# Patient Record
Sex: FEMALE | Race: WHITE | NOT HISPANIC OR LATINO | URBAN - METROPOLITAN AREA
[De-identification: names, ages, dates, MRNs, and addresses within clinical notes are randomized per-mention and may not be internally consistent; named-entity substitution may affect disease eponyms.]

---

## 2017-02-20 ENCOUNTER — OFFICE VISIT (OUTPATIENT)
Dept: ENDOCRINOLOGY | Facility: CLINIC | Age: 82
End: 2017-02-20

## 2017-02-20 VITALS
HEIGHT: 58 IN | SYSTOLIC BLOOD PRESSURE: 131 MMHG | WEIGHT: 140.5 LBS | DIASTOLIC BLOOD PRESSURE: 85 MMHG | BODY MASS INDEX: 29.49 KG/M2 | HEART RATE: 89 BPM

## 2017-02-20 DIAGNOSIS — E11.21 TYPE 2 DIABETES MELLITUS WITH DIABETIC NEPHROPATHY, WITHOUT LONG-TERM CURRENT USE OF INSULIN (H): ICD-10-CM

## 2017-02-20 DIAGNOSIS — M81.0 OSTEOPOROSIS: Primary | ICD-10-CM

## 2017-02-20 DIAGNOSIS — N18.30 CHRONIC KIDNEY DISEASE, STAGE III (MODERATE) (H): ICD-10-CM

## 2017-02-20 DIAGNOSIS — R60.0 BILATERAL LEG EDEMA: ICD-10-CM

## 2017-02-20 DIAGNOSIS — N25.81 SECONDARY RENAL HYPERPARATHYROIDISM (H): ICD-10-CM

## 2017-02-20 LAB — HBA1C MFR BLD: 6.6 % (ref 4.3–6)

## 2017-02-20 ASSESSMENT — PAIN SCALES - GENERAL: PAINLEVEL: NO PAIN (0)
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
## 2017-02-20 NOTE — NURSING NOTE
Chief Complaint   Patient presents with     RECHECK     F/U TYPE II DM     Vero Dalal, CMA  Endocrinology [facility] Diabetes 3G    Capillary Fingerstick performed for an Hemoglobin A1C test
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
## 2017-02-20 NOTE — MR AVS SNAPSHOT
After Visit Summary   2/20/2017    Angella Ward    MRN: [id number]           Patient Information     Date Of Birth          1/7/1934        Visit Information        Provider Department      2/20/2017 12:30 PM Dalila Thayer MD [facility]        Today's Diagnoses     Osteoporosis    -  1    Type 2 diabetes mellitus with diabetic nephropathy, without long-term current use of insulin (H)        Secondary renal hyperparathyroidism (H)        Chronic kidney disease, stage III (moderate)        Bilateral leg edema           Follow-ups after your visit        Follow-up notes from your care team     Return in about 6 months (around 8/20/2017).      Your next 10 appointments already scheduled     Jun 07, 2017 11:00 AM CDT   (Arrive by 10:45 AM)   Return Visit with Karen Keller MD   [facility] ([facility])    [address] [place] [address] [phone]            Aug 21, 2017 11:00 AM CDT   (Arrive by 10:45 AM)   RETURN ENDOCRINE with Dalila Thayer MD   [facility] ([facility])    [address] [place] [address] [phone]              Future tests that were ordered for you today     Open Future Orders        Priority Expected Expires Ordered    Cortisol Routine 3/1/2017 2/20/2018 2/20/2017    Adrenal corticotropin Routine 3/1/2017 2/20/2018 2/20/2017    Bone specific alk phosphatase Routine 3/1/2017 2/20/2018 2/20/2017            Who to contact     Please call your clinic at [phone] to:    Ask questions about your health    Make or cancel appointments    Discuss your medicines    Learn about your test results    Speak to your doctor   If you have compliments or concerns about an experience at your clinic, or if you wish to file a complaint, please contact [facility] Patient Relations at [phone] or email us at  "[email or web address]         Additional Information About Your Visit        [email or web address] Information     Conecta 2 is an electronic gateway that provides easy, online access to your medical records. With Conecta 2, you can request a clinic appointment, read your test results, renew a prescription or communicate with your care team.     To sign up for Conecta 2 visit the website at [email or web address]   You will be asked to enter the access code listed below, as well as some personal information. Please follow the directions to create your username and password.     Your access code is: [email or web address]  Expires: 2017  6:30 AM     Your access code will  in 90 days. If you need help or a new code, please contact your [facility] or call [phone] for assistance.        Care EveryWhere ID     This is your Care EveryWhere ID. This could be used by other organizations to access your [facility] medical records  [id number]        Your Vitals Were     Pulse Height BMI (Body Mass Index)             89 1.473 m (4' 10\") 29.36 kg/m2          Blood Pressure from Last 3 Encounters:   17 131/85   08/15/16 150/90   16 142/87    Weight from Last 3 Encounters:   17 63.7 kg (140 lb 8 oz)   08/15/16 64.9 kg (143 lb)   16 64.4 kg (142 lb)                 Today's Medication Changes          These changes are accurate as of: 17  1:31 PM.  If you have any questions, ask your nurse or doctor.               Start taking these medicines.        Dose/Directions    COMPRESSION STOCKINGS   Used for:  Chronic kidney disease, stage III (moderate), Bilateral leg edema, Type 2 diabetes mellitus with diabetic nephropathy, without long-term current use of insulin (H)   Started by:  Dalila Thayer MD        Please dispense 2 pairs of customized compression stockings   Quantity:  2 each   Refills:  3            Where to get your medicines      These medications " were sent to [facility] #[id number] - [place], [place] - [address]  [address] [facility] [address]     Phone:  [phone]     COMPRESSION STOCKINGS                Primary Care Provider Office Phone # Fax #    [facility] [phone] [address] [facility] [address]        Thank you!     Thank you for choosing [facility]  for your care. Our goal is always to provide you with excellent care. Hearing back from our patients is one way we can continue to improve our services. Please take a few minutes to complete the written survey that you may receive in the mail after your visit with us. Thank you!             Your Updated Medication List - Protect others around you: Learn how to safely use, store and throw away your medicines at www.disposemymeds.org.          This list is accurate as of: 2/20/17  1:31 PM.  Always use your most recent med list.                   Brand Name Dispense Instructions for use    aspirin 81 MG chewable tablet     108 tablet    Take 1 tablet (81 mg) by mouth daily       atorvastatin 10 MG tablet    LIPITOR    90 tablet    Take 1 tablet (10 mg) by mouth daily       * blood glucose monitoring test strip    no brand specified    3 Month    1 Box by Device route 3 times daily. Please dispense three month supply with three refills of whatever test strip Angella needs.  Thank you.       * ACCU-CHEK ACTIVE STRIPS test strip   Generic drug:  blood glucose monitoring          blood pressure kit Kit     1 kit    1 kit 2 times daily       COMPRESSION STOCKINGS     2 each    Please dispense 2 pairs of customized compression stockings       furosemide 20 MG tablet    LASIX     Take 20 mg by mouth       GHT BLOOD GLUCOSE MONITOR W/DEVICE Kit          glimepiride 1 MG tablet    AMARYL    90 tablet    Take 1 tablet (1 mg) by mouth every morning (before breakfast)       losartan 100 MG tablet    COZAAR     Take 100 mg by mouth       metFORMIN  1000 MG tablet    GLUCOPHAGE    180 tablet    Take 1 tablet (1,000 mg) by mouth 2 times daily (with meals)       * Notice:  This list has 2 medication(s) that are the same as other medications prescribed for you. Read the directions carefully, and ask your doctor or other care provider to review them with you.

## 2017-03-06 ENCOUNTER — TELEPHONE (OUTPATIENT)
Dept: ENDOCRINOLOGY | Facility: CLINIC | Age: 82
End: 2017-03-06

## 2017-03-06 NOTE — TELEPHONE ENCOUNTER
----- Message from Alycia Weir RN sent at 3/6/2017  2:03 PM CST -----  Regarding: FW: lab orders faxed to [facility]  Contact: [phone]      ----- Message -----     From: Sharla Cavanaugh     Sent: 3/6/2017   1:37 PM       To: [facility] T[facility]-  Subject: lab orders faxed to [facility]                       Pt would like to have Dr Thayer lab orders faxed to [facility] [phone] per previous request.    Thanks    Sharla JACOBSON    Please DO NOT send this message and/or reply back to sender.  Call Center Representatives DO NOT respond to messages.

## 2017-03-06 NOTE — TELEPHONE ENCOUNTER
----- Message from Alycia Weir RN sent at 3/6/2017  2:02 PM CST -----  Regarding: FW: Correct Fax # - Dr Cano  Contact: [phone]      ----- Message -----     From: Oscar Vyas     Sent: 3/6/2017   1:43 PM       To: [facility] T[facility]-  Subject: Correct Fax # - Dr Cano                     The pt called with the correct fax # for the [facility] lab: [phone].    The pt can be reached at [phone] if needed    Thanks - oscar    Please DO NOT send this message and/or reply back to sender.  Call Center Representatives DO NOT respond to messages.

## 2017-04-24 ENCOUNTER — OFFICE VISIT (OUTPATIENT)
Dept: DERMATOLOGY | Facility: CLINIC | Age: 82
End: 2017-04-24

## 2017-04-24 DIAGNOSIS — I87.2 STASIS DERMATITIS OF BOTH LEGS: Primary | ICD-10-CM

## 2017-04-24 DIAGNOSIS — L82.1 SEBORRHEIC KERATOSIS: ICD-10-CM

## 2017-04-24 RX ORDER — TRIAMCINOLONE ACETONIDE 1 MG/G
OINTMENT TOPICAL 2 TIMES DAILY
Qty: 80 G | Refills: 1 | Status: SHIPPED | OUTPATIENT
Start: 2017-04-24 | End: 2017-07-06

## 2017-04-24 ASSESSMENT — PAIN SCALES - GENERAL: PAINLEVEL: MODERATE PAIN (5)

## 2017-04-24 NOTE — NURSING NOTE
Dermatology Rooming Note    Angella Ward's goals for this visit include:   Chief Complaint   Patient presents with     Derm Problem     Angella states she has a rash on her left leg starting on her right. It is painful, dry and itchy.      Derm Problem     Angella states the mole on her left chest is changing.     Risa Monzon CMA

## 2017-04-24 NOTE — PROGRESS NOTES
[facility] Dermatology Note      Dermatology Problem List:  1. History of NMSC  -BCC, left upper arm, s/p ED&C 10/27/2016  -SCCIS, right forearm s/p excision 10/2014  -Superficial BCC, mid inferior back, s/p ED&C.    -Superficial and nodular BCC, mid superior back, s/p excision 10/02/2014.     2. Actinic keratoses    Encounter Date: Apr 24, 2017    CC:  Chief Complaint   Patient presents with     Derm Problem     Angella states she has a rash on her left leg starting on her right. It is painful, dry and itchy.      Derm Problem     Angella states the mole on her left chest is changing.       History of Present Illness:  Ms. Angella Ward is a 83 year old female who presents as a follow-up after four months with a new concern. She states she has had a rash on her lower legs for the last 2-3 months. It started on the left and now is on her right. There are times when it is dry and itchy. She is not using anything on these areas. She also notices a growth on the left side of her chest. She thinks it is changing. It is not painful or bleeding.  She otherwise has no new/concerning/changing lesions.     Past Medical History:   Patient Active Problem List   Diagnosis     Hyperlipidemia LDL goal <100     Colitis     Rotator cuff syndrome     Advanced directives, counseling/discussion     Hypertension goal BP (blood pressure) < 140/90     Adhesive capsulitis of left shoulder     CKD (chronic kidney disease) stage 3, GFR 30-59 ml/min     Chronic pain in shoulder     Arthritis of foot, left     Basal cell carcinoma in situ of skin of shoulder     Seborrheic keratosis     Toe pain, right     Glenohumeral arthritis     Osteoporosis     Obesity     Balance problems     Skin lesion     Limitation of joint motion of shoulder     SOB (shortness of breath)     Chronic diarrhea     Hard of hearing, right     Type 2 diabetes mellitus with diabetic nephropathy (H)     Microalbuminuric diabetic nephropathy (H)      Edema of left lower extremity     CKD (chronic kidney disease) stage 4, GFR 15-29 ml/min (H)     Past Medical History:   Diagnosis Date     Basal cell carcinoma      Chronic pain in shoulder 9/28/2012     CKD (chronic kidney disease) stage 3, GFR 30-59 ml/min 6/1/2012     Glenohumeral arthritis 6/10/2013    Noted on xray [facility], has rec'd left shoulder injections 7/13/10, see scan     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 The patient denies use of tanning beds or sun exposure.     Family History:  Not pertinent to this visit.     Medications:  Current Outpatient Prescriptions   Medication Sig Dispense Refill     VITAMIN D, CHOLECALCIFEROL, PO Take 1,000 Units by mouth daily       COMPRESSION STOCKINGS Please dispense 2 pairs of customized compression stockings 2 each 3     furosemide (LASIX) 20 MG tablet Take 20 mg by mouth       losartan (COZAAR) 100 MG tablet Take 100 mg by mouth       blood glucose monitoring (ACCU-CHEK ACTIVE STRIPS) test strip        Blood Glucose Monitoring Suppl ([facility] BLOOD GLUCOSE MONITOR) W/DEVICE KIT        glimepiride (AMARYL) 1 MG tablet Take 1 tablet (1 mg) by mouth every morning (before breakfast) 90 tablet 3     metFORMIN (GLUCOPHAGE) 1000 MG tablet Take 1 tablet (1,000 mg) by mouth 2 times daily (with meals) 180 tablet 3     aspirin 81 MG chewable tablet Take 1 tablet (81 mg) by mouth daily 108 tablet 3     Blood Pressure Monitoring (BLOOD PRESSURE KIT) KIT 1 kit 2 times daily 1 kit 0     glucose blood VI test strips strip 1 Box by Device route 3 times daily. Please dispense three month supply with three refills of whatever test strip Angella needs.  Thank you. 3 Month 3     atorvastatin (LIPITOR) 10 MG tablet Take 1 tablet (10 mg)  by mouth daily (Patient not taking: Reported on 4/24/2017) 90 tablet 3     Allergies   Allergen Reactions     Nifedipine      PN: LW Reaction: ankle swelling     Nkda [No Known Drug Allergies]      No Clinical Screening - See Comments      PN: TODD CM1: >>> NO CONTRAST ADVERSE REACTION <<<     Reaction :         Review of Systems:  -Skin: As above in HPI. No additional skin concerns.  -Const: The patient is generally feeling well today.     Physical exam:  Vitals: There were no vitals taken for this visit.  GEN: This is a well developed, well-nourished female in no acute distress, in a pleasant mood.     SKIN: Sun-exposed skin, which includes the head/face, neck, both arms, digits, and/or nails was examined. Exam of chest, abdomen and back performed. Also examined bilateral legs from knees to ankles. She declines further exam of areas.  - Numerous waxy brown, papules of various sizes in the inframamillary folds, on entire back  and right popliteal fossa  - Left upper arm, there is a  pink scar at the site of prior ED&C  - On the upper right back, there are two healing hypopigmented atrophic scars. No signs of recurrence    -There is erythema noted to bilateral lower extremities, non tender, mild edema note. Strong pulses dorsalis pedis bilaterally.   - No other lesions of concern on areas examined.     Impression/Plan:  1. History of nonmelanoma skin cancer, no clincial evidence of recurrence. ED&C site from recent BCC is healing well.     Patient advised on the use of sun protection and monitoring for any new/changing lesions    2. Stasis dermatitis bilateral lower extremities,.  Start  use triamcinolone 0.1% ointment twice daily while redness is there. Stop if it resolves.    Steroid ed given.     Follow up in 1 month.     Recommend compression stockings during the day and getting her legs up.     Recommend she take her diuretics regularly.   3. Seborrheic keratosis on the left chest. Reassurance given.    All risk,  benefits and alternatives were discussed with patient.  Patient is in agreement and understands the assessment and plan.  All questions were answered.  Sun Screen Education was given.   Return to Clinic in 1 month or sooner as needed.   Risa Freeman PA-C

## 2017-04-24 NOTE — MR AVS SNAPSHOT
After Visit Summary   4/24/2017    Angella Ward    MRN: [id number]           Patient Information     Date Of Birth          1/7/1934        Visit Information        Provider Department      4/24/2017 2:00 PM Risa Freeman PA-C [facility]        Today's Diagnoses     Stasis dermatitis of both legs    -  1       Follow-ups after your visit        Your next 10 appointments already scheduled     May 24, 2017 11:00 AM CDT   (Arrive by 10:45 AM)   Return Visit with KELSEY Dang [facility]ology ([facility])    [address] [place] [address] [phone]            Jun 07, 2017 11:00 AM CDT   (Arrive by 10:45 AM)   Return Visit with Karen Keller MD   [facility]at[facility] ([facility])    [address] [place] [address] [phone]            Aug 21, 2017 11:00 AM CDT   (Arrive by 10:45 AM)   RETURN ENDOCRINE with Dalila Thayer MD   [facility] ([facility])    [address] [place] [address] [phone]              Who to contact     Please call your clinic at [phone] to:    Ask questions about your health    Make or cancel appointments    Discuss your medicines    Learn about your test results    Speak to your doctor   If you have compliments or concerns about an experience at your clinic, or if you wish to file a complaint, please contact [facility] Patient Relations at [phone] or email us at [email or web address]         Additional Information About Your Visit        MyChart Information     Space Adventures is an electronic gateway that provides easy, online access to your medical records. With Space Adventures, you can request a clinic appointment, read your test results, renew a prescription or communicate with your care team.     To sign up for  MyCevelinet visit the website at [email or web address]   You will be asked to enter the access code listed below, as well as some personal information. Please follow the directions to create your username and password.     Your access code is: [email or web address]  Expires: 2017  7:30 AM     Your access code will  in 90 days. If you need help or a new code, please contact your [facility] or call [phone] for assistance.        Care EveryWhere ID     This is your Care EveryWhere ID. This could be used by other organizations to access your [place] medical records  [id number]         Blood Pressure from Last 3 Encounters:   17 131/85   08/15/16 150/90   16 142/87    Weight from Last 3 Encounters:   17 63.7 kg (140 lb 8 oz)   08/15/16 64.9 kg (143 lb)   16 64.4 kg (142 lb)              Today, you had the following     No orders found for display         Today's Medication Changes          These changes are accurate as of: 17  2:18 PM.  If you have any questions, ask your nurse or doctor.               Start taking these medicines.        Dose/Directions    triamcinolone 0.1 % ointment   Commonly known as:  KENALOG   Used for:  Stasis dermatitis of both legs   Started by:  Risa Freeman PA-C        Apply topically 2 times daily   Quantity:  80 g   Refills:  1            Where to get your medicines      [id number]  [address]   [facility] [address]     Phone:  [phone]     triamcinolone 0.1 % ointment                Primary Care Provider Office Phone # Fax #    [facility] [phone]       [facility] [facility] [address]        Thank you!     Thank you for choosing [facility]MAT[facility]  for your care. Our goal is always to provide you with excellent care. Hearing back from our patients is one way we can continue to  improve our services. Please take a few minutes to complete the written survey that you may receive in the mail after your visit with us. Thank you!             Your Updated Medication List - Protect others around you: Learn how to safely use, store and throw away your medicines at www.disposemymeds.org.          This list is accurate as of: 4/24/17  2:18 PM.  Always use your most recent med list.                   Brand Name Dispense Instructions for use    aspirin 81 MG chewable tablet     108 tablet    Take 1 tablet (81 mg) by mouth daily       atorvastatin 10 MG tablet    LIPITOR    90 tablet    Take 1 tablet (10 mg) by mouth daily       * blood glucose monitoring test strip    no brand specified    3 Month    1 Box by Device route 3 times daily. Please dispense three month supply with three refills of whatever test strip Angella needs.  Thank you.       * ACCU-CHEK ACTIVE STRIPS test strip   Generic drug:  blood glucose monitoring          blood pressure kit Kit     1 kit    1 kit 2 times daily       COMPRESSION STOCKINGS     2 each    Please dispense 2 pairs of customized compression stockings       furosemide 20 MG tablet    LASIX     Take 20 mg by mouth       GHT BLOOD GLUCOSE MONITOR W/DEVICE Kit          glimepiride 1 MG tablet    AMARYL    90 tablet    Take 1 tablet (1 mg) by mouth every morning (before breakfast)       losartan 100 MG tablet    COZAAR     Take 100 mg by mouth       metFORMIN 1000 MG tablet    GLUCOPHAGE    180 tablet    Take 1 tablet (1,000 mg) by mouth 2 times daily (with meals)       triamcinolone 0.1 % ointment    KENALOG    80 g    Apply topically 2 times daily       VITAMIN D (CHOLECALCIFEROL) PO      Take 1,000 Units by mouth daily       * Notice:  This list has 2 medication(s) that are the same as other medications prescribed for you. Read the directions carefully, and ask your doctor or other care provider to review them with you.

## 2017-04-24 NOTE — LETTER
4/24/2017       RE: Angella Ward  [address]   [facility] [address]     Dear Colleague,    Thank you for referring your patient, Angella Ward, to the [facility] DERMATOLOGY at [facility]. Please see a copy of my visit note below.    [facility] Dermatology Note      Dermatology Problem List:  1. History of NMSC  -BCC, left upper arm, s/p ED&C 10/27/2016  -SCCIS, right forearm s/p excision 10/2014  -Superficial BCC, mid inferior back, s/p ED&C.    -Superficial and nodular BCC, mid superior back, s/p excision 10/02/2014.     2. Actinic keratoses    Encounter Date: Apr 24, 2017    CC:  Chief Complaint   Patient presents with     Derm Problem     Angella states she has a rash on her left leg starting on her right. It is painful, dry and itchy.      Derm Problem     Angella states the mole on her left chest is changing.       History of Present Illness:  Ms. Angella Ward is a 83 year old female who presents as a follow-up after four months with a new concern. She states she has had a rash on her lower legs for the last 2-3 months. It started on the left and now is on her right. There are times when it is dry and itchy. She is not using anything on these areas. She also notices a growth on the left side of her chest. She thinks it is changing. It is not painful or bleeding.  She otherwise has no new/concerning/changing lesions.     Past Medical History:   Patient Active Problem List   Diagnosis     Hyperlipidemia LDL goal <100     Colitis     Rotator cuff syndrome     Advanced directives, counseling/discussion     Hypertension goal BP (blood pressure) < 140/90     Adhesive capsulitis of left shoulder     CKD (chronic kidney disease) stage 3, GFR 30-59 ml/min     Chronic pain in shoulder     Arthritis of foot, left     Basal cell carcinoma in situ of skin of shoulder     Seborrheic keratosis     Toe pain, right     Glenohumeral arthritis      Osteoporosis     Obesity     Balance problems     Skin lesion     Limitation of joint motion of shoulder     SOB (shortness of breath)     Chronic diarrhea     Hard of hearing, right     Type 2 diabetes mellitus with diabetic nephropathy (H)     Microalbuminuric diabetic nephropathy (H)     Edema of left lower extremity     CKD (chronic kidney disease) stage 4, GFR 15-29 ml/min (H)     Past Medical History:   Diagnosis Date     Basal cell carcinoma      Chronic pain in shoulder 9/28/2012     CKD (chronic kidney disease) stage 3, GFR 30-59 ml/min 6/1/2012     Glenohumeral arthritis 6/10/2013    Noted on xray [facility], has rec'd left shoulder injections 7/13/10, see scan     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 The patient denies use of tanning beds or sun exposure.     Family History:  Not pertinent to this visit.     Medications:  Current Outpatient Prescriptions   Medication Sig Dispense Refill     VITAMIN D, CHOLECALCIFEROL, PO Take 1,000 Units by mouth daily       COMPRESSION STOCKINGS Please dispense 2 pairs of customized compression stockings 2 each 3     furosemide (LASIX) 20 MG tablet Take 20 mg by mouth       losartan (COZAAR) 100 MG tablet Take 100 mg by mouth       blood glucose monitoring (ACCU-CHEK ACTIVE STRIPS) test strip        Blood Glucose Monitoring Suppl ([facility] BLOOD GLUCOSE MONITOR) W/DEVICE KIT        glimepiride (AMARYL) 1 MG tablet Take 1 tablet (1 mg) by mouth every morning (before breakfast) 90 tablet 3     metFORMIN (GLUCOPHAGE) 1000 MG tablet Take 1 tablet (1,000 mg) by mouth 2 times daily (with meals) 180 tablet 3     aspirin 81 MG chewable tablet Take 1 tablet (81 mg) by mouth daily 108 tablet 3     Blood Pressure Monitoring (BLOOD  PRESSURE KIT) KIT 1 kit 2 times daily 1 kit 0     glucose blood VI test strips strip 1 Box by Device route 3 times daily. Please dispense three month supply with three refills of whatever test strip Angella needs.  Thank you. 3 Month 3     atorvastatin (LIPITOR) 10 MG tablet Take 1 tablet (10 mg) by mouth daily (Patient not taking: Reported on 4/24/2017) 90 tablet 3     Allergies   Allergen Reactions     Nifedipine      PN: LW Reaction: ankle swelling     Nkda [No Known Drug Allergies]      No Clinical Screening - See Comments      PN: LW CM1: >>> NO CONTRAST ADVERSE REACTION <<<     Reaction :     Review of Systems:  -Skin: As above in HPI. No additional skin concerns.  -Const: The patient is generally feeling well today.     Physical exam:  Vitals: There were no vitals taken for this visit.  GEN: This is a well developed, well-nourished female in no acute distress, in a pleasant mood.     SKIN: Sun-exposed skin, which includes the head/face, neck, both arms, digits, and/or nails was examined. Exam of chest, abdomen and back performed. Also examined bilateral legs from knees to ankles. She declines further exam of areas.  - Numerous waxy brown, papules of various sizes in the inframamillary folds, on entire back  and right popliteal fossa  - Left upper arm, there is a  pink scar at the site of prior ED&C  - On the upper right back, there are two healing hypopigmented atrophic scars. No signs of recurrence    -There is erythema noted to bilateral lower extremities, non tender, mild edema note. Strong pulses dorsalis pedis bilaterally.   - No other lesions of concern on areas examined.     Impression/Plan:  1. History of nonmelanoma skin cancer, no clincial evidence of recurrence. ED&C site from recent BCC is healing well.     Patient advised on the use of sun protection and monitoring for any new/changing lesions    2. Stasis dermatitis bilateral lower extremities,.  Start  use triamcinolone 0.1% ointment twice  daily while redness is there. Stop if it resolves.    Steroid ed given.     Follow up in 1 month.     Recommend compression stockings during the day and getting her legs up.     Recommend she take her diuretics regularly.   3. Seborrheic keratosis on the left chest. Reassurance given.    All risk, benefits and alternatives were discussed with patient.  Patient is in agreement and understands the assessment and plan.  All questions were answered.  Sun Screen Education was given.   Return to Clinic in 1 month or sooner as needed.   Risa Freeman PA-C

## 2017-05-24 ENCOUNTER — OFFICE VISIT (OUTPATIENT)
Dept: DERMATOLOGY | Facility: CLINIC | Age: 82
End: 2017-05-24

## 2017-05-24 DIAGNOSIS — I87.2 STASIS DERMATITIS OF BOTH LEGS: Primary | ICD-10-CM

## 2017-05-24 ASSESSMENT — PAIN SCALES - GENERAL: PAINLEVEL: NO PAIN (0)

## 2017-05-24 NOTE — NURSING NOTE
"Dermatology Rooming Note    Angella Ward's goals for this visit include:   Chief Complaint   Patient presents with     Derm Problem     Stasis dermatitis follow up, Angella states \" it is a little better.\"     Estrella Sorto LPN  "

## 2017-05-24 NOTE — LETTER
"5/24/2017       RE: Angella Ward  [address]   [facility] [address]     Dear Colleague,    Thank you for referring your patient, Angella Ward, to the [facility] DERMATOLOGY at [facility]. Please see a copy of my visit note below.          [facility] Dermatology Note      Dermatology Problem List:  1. History of NMSC  -BCC, left upper arm, s/p ED&C 10/27/2016  -SCCIS, right forearm s/p excision 10/2014  -Superficial BCC, mid inferior back, s/p ED&C.    -Superficial and nodular BCC, mid superior back, s/p excision 10/02/2014.     2. Actinic keratoses  3. Stasis dermatitis. Triamcinolone ointment.   Encounter Date: May 24, 2017    CC:  Chief Complaint   Patient presents with     Derm Problem     Stasis dermatitis follow up, Angella states \" it is a little better.\"       History of Present Illness:  Ms. Angella Ward is a 83 year old female who presents after one month for follow up of statis dermatitis. She was last seen 4/24/2017. She was prescribed triamcinolone 0.1% ointment twice daily. She believe the redness has improved but is still mildly there, especially if she does not take her diuretic for 1-2 days. She does not like to take these kinds of medications when she leaves her home for errands or appointments.     She otherwise has no new/concerning/changing lesions.         Past Medical History:   Patient Active Problem List   Diagnosis     Hyperlipidemia LDL goal <100     Colitis     Rotator cuff syndrome     Advanced directives, counseling/discussion     Hypertension goal BP (blood pressure) < 140/90     Adhesive capsulitis of left shoulder     CKD (chronic kidney disease) stage 3, GFR 30-59 ml/min     Chronic pain in shoulder     Arthritis of foot, left     Basal cell carcinoma in situ of skin of shoulder     Seborrheic keratosis     Toe pain, right     Glenohumeral arthritis     Osteoporosis     Obesity     Balance problems "     Skin lesion     Limitation of joint motion of shoulder     SOB (shortness of breath)     Chronic diarrhea     Hard of hearing, right     Type 2 diabetes mellitus with diabetic nephropathy (H)     Microalbuminuric diabetic nephropathy (H)     Edema of left lower extremity     CKD (chronic kidney disease) stage 4, GFR 15-29 ml/min (H)     Past Medical History:   Diagnosis Date     Basal cell carcinoma      Chronic pain in shoulder 9/28/2012     CKD (chronic kidney disease) stage 3, GFR 30-59 ml/min 6/1/2012     Glenohumeral arthritis 6/10/2013    Noted on xray [facility], has rec'd left shoulder injections 7/13/10, see scan     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 The patient denies use of tanning beds or sun exposure.     Family History:  Not pertinent to this visit.     Medications:  Current Outpatient Prescriptions   Medication Sig Dispense Refill     glimepiride (AMARYL) 1 MG tablet Take 1 tablet (1 mg) by mouth every morning (before breakfast) 90 tablet 3     Probiotic Product (PROBIOTIC & ACIDOPHILUS EX ST) CAPS Take 1 capsule by mouth daily       carvedilol (COREG) 6.25 MG tablet Take 1 tablet (6.25 mg) by mouth 2 times daily (with meals) 180 tablet 3     triamcinolone (KENALOG) 0.1 % ointment Apply to lower leg rash nightly (cover with socks) 80 g 3     VITAMIN D, CHOLECALCIFEROL, PO Take 1,000 Units by mouth daily       COMPRESSION STOCKINGS Please dispense 2 pairs of customized compression stockings 2 each 3     furosemide (LASIX) 20 MG tablet Take 80 mg by mouth        losartan (COZAAR) 100 MG tablet Take 100 mg by mouth       blood glucose monitoring (ACCU-CHEK ACTIVE STRIPS) test strip        Blood Glucose Monitoring Suppl (GHT BLOOD GLUCOSE MONITOR)  W/DEVICE KIT        atorvastatin (LIPITOR) 10 MG tablet Take 1 tablet (10 mg) by mouth daily (Patient not taking: Reported on 7/6/2017) 90 tablet 3     metFORMIN (GLUCOPHAGE) 1000 MG tablet Take 1 tablet (1,000 mg) by mouth 2 times daily (with meals) 180 tablet 3     aspirin 81 MG chewable tablet Take 1 tablet (81 mg) by mouth daily 108 tablet 3     Blood Pressure Monitoring (BLOOD PRESSURE KIT) KIT 1 kit 2 times daily 1 kit 0     glucose blood VI test strips strip 1 Box by Device route 3 times daily. Please dispense three month supply with three refills of whatever test strip Angella needs.  Thank you. 3 Month 3     Allergies   Allergen Reactions     Nifedipine      PN: LW Reaction: ankle swelling     Nkda [No Known Drug Allergies]      No Clinical Screening - See Comments      PN: LW CM1: >>> NO CONTRAST ADVERSE REACTION <<<     Reaction :     Latex Rash         Review of Systems:  -Skin: As above in HPI. No additional skin concerns.  -Const: The patient is generally feeling well today.     Physical exam:  Vitals: There were no vitals taken for this visit.  GEN: This is a well developed, well-nourished female in no acute distress, in a pleasant mood.     SKIN: Sun-exposed skin, which includes the head/face, neck, both arms, digits, and/or nails was examined. Also examined bilateral legs from knees to ankles. Significant for:   -There is erythema noted to bilateral lower extremities, non tender, mild edema note. Strong pulses dorsalis pedis bilaterally.   - No other lesions of concern on areas examined.     Impression/Plan:    Stasis dermatitis bilateral lower extremities,.improved.   Use triamcinolone 0.1% ointment twice daily while redness is there. Stop if it resolves.    Steroid ed given.      Recommend compression stockings during the day and getting her legs up.     Recommend she take her diuretics regularly.       All risk, benefits and alternatives were discussed with patient.  Patient is in agreement and  understands the assessment and plan.  All questions were answered.  Sun Screen Education was given.   Return to Clinic in 2 months or sooner as needed.   Risa Freeman PA-C

## 2017-05-24 NOTE — MR AVS SNAPSHOT
After Visit Summary   5/24/2017    Angella Ward    MRN: [id number]           Patient Information     Date Of Birth          1/7/1934        Visit Information        Provider Department      5/24/2017 11:00 AM Risa Freeman PA-C [facility] Dermatology         Follow-ups after your visit        Your next 10 appointments already scheduled     Jul 06, 2017 10:00 AM CDT   (Arrive by 9:45 AM)   Return Visit with MD YNES Zamudio [facility] Dermatology ([facility])    [address] [place] [id number] [phone]            Aug 21, 2017 11:00 AM CDT   (Arrive by 10:45 AM)   RETURN ENDOCRINE with Dalila Thayer MD   [facility] Endocrinology ([facility])    [address] [place] [id number] [phone]            Aug 28, 2017 11:45 AM CDT   (Arrive by 11:30 AM)   Return Visit with KELSEY Dang [facility] Dermatology ([facility])    [address] [place] [id number] [phone]              Who to contact     Please call your clinic at [phone] to:    Ask questions about your health    Make or cancel appointments    Discuss your medicines    Learn about your test results    Speak to your doctor   If you have compliments or concerns about an experience at your clinic, or if you wish to file a complaint, please contact [facility] Patient Relations at [phone] or email us at [email or web address]         Additional Information About Your Visit        MyChart Information     wumo is an electronic gateway that provides easy, online access to your medical records. With wumo, you can request a clinic appointment, read your test results, renew a prescription or communicate with your care team.     To sign up for [employer] visit the website at [email or web address]   You  will be asked to enter the access code listed below, as well as some personal information. Please follow the directions to create your username and password.     Your access code is: [email or web address]  Expires: 2017  6:30 AM     Your access code will  in 90 days. If you need help or a new code, please contact your [facility] or call [phone] for assistance.        Care EveryWhere ID     This is your Care EveryWhere ID. This could be used by other organizations to access your [place] medical records  [id number]         Blood Pressure from Last 3 Encounters:   17 131/85   08/15/16 150/90   16 142/87    Weight from Last 3 Encounters:   17 63.7 kg (140 lb 8 oz)   08/15/16 64.9 kg (143 lb)   16 64.4 kg (142 lb)              Today, you had the following     No orders found for display       Primary Care Provider Office Phone # Fax #    [facility] [phone] [address] [facility] [address]        Thank you!     Thank you for choosing [facility] DERMATOLOGY  for your care. Our goal is always to provide you with excellent care. Hearing back from our patients is one way we can continue to improve our services. Please take a few minutes to complete the written survey that you may receive in the mail after your visit with us. Thank you!             Your Updated Medication List - Protect others around you: Learn how to safely use, store and throw away your medicines at www.disposemymeds.org.          This list is accurate as of: 17 11:12 AM.  Always use your most recent med list.                   Brand Name Dispense Instructions for use    aspirin 81 MG chewable tablet     108 tablet    Take 1 tablet (81 mg) by mouth daily       atorvastatin 10 MG tablet    LIPITOR    90 tablet    Take 1 tablet (10 mg) by mouth daily       * blood glucose monitoring test strip    no brand specified    3 Month    1 Box by Device  route 3 times daily. Please dispense three month supply with three refills of whatever test strip Angella needs.  Thank you.       * ACCU-CHEK ACTIVE STRIPS test strip   Generic drug:  blood glucose monitoring          blood pressure kit Kit     1 kit    1 kit 2 times daily       COMPRESSION STOCKINGS     2 each    Please dispense 2 pairs of customized compression stockings       furosemide 20 MG tablet    LASIX     Take 20 mg by mouth       GHT BLOOD GLUCOSE MONITOR W/DEVICE Kit          glimepiride 1 MG tablet    AMARYL    90 tablet    Take 1 tablet (1 mg) by mouth every morning (before breakfast)       losartan 100 MG tablet    COZAAR     Take 100 mg by mouth       metFORMIN 1000 MG tablet    GLUCOPHAGE    180 tablet    Take 1 tablet (1,000 mg) by mouth 2 times daily (with meals)       triamcinolone 0.1 % ointment    KENALOG    80 g    Apply topically 2 times daily       VITAMIN D (CHOLECALCIFEROL) PO      Take 1,000 Units by mouth daily       * Notice:  This list has 2 medication(s) that are the same as other medications prescribed for you. Read the directions carefully, and ask your doctor or other care provider to review them with you.

## 2017-05-24 NOTE — PROGRESS NOTES
"      [facility] Dermatology Note      Dermatology Problem List:  1. History of NMSC  -BCC, left upper arm, s/p ED&C 10/27/2016  -SCCIS, right forearm s/p excision 10/2014  -Superficial BCC, mid inferior back, s/p ED&C.    -Superficial and nodular BCC, mid superior back, s/p excision 10/02/2014.     2. Actinic keratoses  3. Stasis dermatitis. Triamcinolone ointment.   Encounter Date: May 24, 2017    CC:  Chief Complaint   Patient presents with     Derm Problem     Stasis dermatitis follow up, Angella states \" it is a little better.\"       History of Present Illness:  Ms. Angella Ward is a 83 year old female who presents after one month for follow up of statis dermatitis. She was last seen 4/24/2017. She was prescribed triamcinolone 0.1% ointment twice daily. She believe the redness has improved but is still mildly there, especially if she does not take her diuretic for 1-2 days. She does not like to take these kinds of medications when she leaves her home for errands or appointments.     She otherwise has no new/concerning/changing lesions.         Past Medical History:   Patient Active Problem List   Diagnosis     Hyperlipidemia LDL goal <100     Colitis     Rotator cuff syndrome     Advanced directives, counseling/discussion     Hypertension goal BP (blood pressure) < 140/90     Adhesive capsulitis of left shoulder     CKD (chronic kidney disease) stage 3, GFR 30-59 ml/min     Chronic pain in shoulder     Arthritis of foot, left     Basal cell carcinoma in situ of skin of shoulder     Seborrheic keratosis     Toe pain, right     Glenohumeral arthritis     Osteoporosis     Obesity     Balance problems     Skin lesion     Limitation of joint motion of shoulder     SOB (shortness of breath)     Chronic diarrhea     Hard of hearing, right     Type 2 diabetes mellitus with diabetic nephropathy (H)     Microalbuminuric diabetic nephropathy (H)     Edema of left lower extremity     CKD (chronic " kidney disease) stage 4, GFR 15-29 ml/min (H)     Past Medical History:   Diagnosis Date     Basal cell carcinoma      Chronic pain in shoulder 9/28/2012     CKD (chronic kidney disease) stage 3, GFR 30-59 ml/min 6/1/2012     Glenohumeral arthritis 6/10/2013    Noted on xray [facility], has rec'd left shoulder injections 7/13/10, see scan     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 The patient denies use of tanning beds or sun exposure.     Family History:  Not pertinent to this visit.     Medications:  Current Outpatient Prescriptions   Medication Sig Dispense Refill     glimepiride (AMARYL) 1 MG tablet Take 1 tablet (1 mg) by mouth every morning (before breakfast) 90 tablet 3     Probiotic Product (PROBIOTIC & ACIDOPHILUS EX ST) CAPS Take 1 capsule by mouth daily       carvedilol (COREG) 6.25 MG tablet Take 1 tablet (6.25 mg) by mouth 2 times daily (with meals) 180 tablet 3     triamcinolone (KENALOG) 0.1 % ointment Apply to lower leg rash nightly (cover with socks) 80 g 3     VITAMIN D, CHOLECALCIFEROL, PO Take 1,000 Units by mouth daily       COMPRESSION STOCKINGS Please dispense 2 pairs of customized compression stockings 2 each 3     furosemide (LASIX) 20 MG tablet Take 80 mg by mouth        losartan (COZAAR) 100 MG tablet Take 100 mg by mouth       blood glucose monitoring (ACCU-CHEK ACTIVE STRIPS) test strip        Blood Glucose Monitoring Suppl ([facility] BLOOD GLUCOSE MONITOR) W/DEVICE KIT        atorvastatin (LIPITOR) 10 MG tablet Take 1 tablet (10 mg) by mouth daily (Patient not taking: Reported on 7/6/2017) 90 tablet 3     metFORMIN (GLUCOPHAGE) 1000 MG tablet Take 1 tablet (1,000 mg) by mouth 2 times daily (with meals) 180 tablet 3     aspirin 81 MG chewable tablet  Take 1 tablet (81 mg) by mouth daily 108 tablet 3     Blood Pressure Monitoring (BLOOD PRESSURE KIT) KIT 1 kit 2 times daily 1 kit 0     glucose blood VI test strips strip 1 Box by Device route 3 times daily. Please dispense three month supply with three refills of whatever test strip Angella needs.  Thank you. 3 Month 3     Allergies   Allergen Reactions     Nifedipine      PN: LW Reaction: ankle swelling     Nkda [No Known Drug Allergies]      No Clinical Screening - See Comments      PN: LW CM1: >>> NO CONTRAST ADVERSE REACTION <<<     Reaction :     Latex Rash         Review of Systems:  -Skin: As above in HPI. No additional skin concerns.  -Const: The patient is generally feeling well today.     Physical exam:  Vitals: There were no vitals taken for this visit.  GEN: This is a well developed, well-nourished female in no acute distress, in a pleasant mood.     SKIN: Sun-exposed skin, which includes the head/face, neck, both arms, digits, and/or nails was examined. Also examined bilateral legs from knees to ankles. Significant for:   -There is erythema noted to bilateral lower extremities, non tender, mild edema note. Strong pulses dorsalis pedis bilaterally.   - No other lesions of concern on areas examined.     Impression/Plan:    Stasis dermatitis bilateral lower extremities,.improved.   Use triamcinolone 0.1% ointment twice daily while redness is there. Stop if it resolves.    Steroid ed given.      Recommend compression stockings during the day and getting her legs up.     Recommend she take her diuretics regularly.       All risk, benefits and alternatives were discussed with patient.  Patient is in agreement and understands the assessment and plan.  All questions were answered.  Sun Screen Education was given.   Return to Clinic in 2 months or sooner as needed.   Risa Freeman PA-C

## 2017-07-06 ENCOUNTER — OFFICE VISIT (OUTPATIENT)
Dept: DERMATOLOGY | Facility: CLINIC | Age: 82
End: 2017-07-06

## 2017-07-06 DIAGNOSIS — I87.2 STASIS DERMATITIS OF BOTH LEGS: ICD-10-CM

## 2017-07-06 RX ORDER — TRIAMCINOLONE ACETONIDE 1 MG/G
OINTMENT TOPICAL
Qty: 80 G | Refills: 3 | Status: SHIPPED | OUTPATIENT
Start: 2017-07-06

## 2017-07-06 ASSESSMENT — PAIN SCALES - GENERAL: PAINLEVEL: NO PAIN (0)

## 2017-07-06 NOTE — PROGRESS NOTES
[facility] Dermatology Note      Dermatology Problem List:  1. History of NMSC  -BCC, left upper arm, s/p ED&C 10/27/2016  -SCCIS, right forearm s/p excision 10/2014  -Superficial BCC, mid inferior back, s/p ED&C.    -Superficial and nodular BCC, mid superior back, s/p excision 10/02/2014.     2. Actinic keratoses    Encounter Date: Jul 6, 2017    CC:  Chief Complaint   Patient presents with     Derm Problem     Angella is here today for a skin check.  States she has a concerning area on her neck and reddness on her left leg.         History of Present Illness:  Ms. Angella Ward is a 83 year old female who presents after one month for follow up of statis dermatitis . She was prescribed triamcinolone 0.1% ointment twice daily. She hasnt been wearing compression stockings as frequently as she's been focusing on trying  to get the triamcinolone on more frequently. Also notes a rough spot on her left chest that worries her. Its not painful or bleeding.     Past Medical History:   Patient Active Problem List   Diagnosis     Hyperlipidemia LDL goal <100     Colitis     Rotator cuff syndrome     Advanced directives, counseling/discussion     Hypertension goal BP (blood pressure) < 140/90     Adhesive capsulitis of left shoulder     CKD (chronic kidney disease) stage 3, GFR 30-59 ml/min     Chronic pain in shoulder     Arthritis of foot, left     Basal cell carcinoma in situ of skin of shoulder     Seborrheic keratosis     Toe pain, right     Glenohumeral arthritis     Osteoporosis     Obesity     Balance problems     Skin lesion     Limitation of joint motion of shoulder     SOB (shortness of breath)     Chronic diarrhea     Hard of hearing, right     Type 2 diabetes mellitus with diabetic nephropathy (H)     Microalbuminuric diabetic nephropathy (H)     Edema of left lower extremity     CKD (chronic kidney disease) stage 4, GFR 15-29 ml/min (H)     Past Medical History:   Diagnosis Date     Basal  cell carcinoma      Chronic pain in shoulder 9/28/2012     CKD (chronic kidney disease) stage 3, GFR 30-59 ml/min 6/1/2012     Glenohumeral arthritis 6/10/2013    Noted on xrcriselda Eaton, has rec'd left shoulder injections 7/13/10, see scan     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 The patient denies use of tanning beds or sun exposure.     Family History:  Not pertinent to this visit.     Medications:  Current Outpatient Prescriptions   Medication Sig Dispense Refill     VITAMIN D, CHOLECALCIFEROL, PO Take 1,000 Units by mouth daily       triamcinolone (KENALOG) 0.1 % ointment Apply topically 2 times daily 80 g 1     COMPRESSION STOCKINGS Please dispense 2 pairs of customized compression stockings 2 each 3     furosemide (LASIX) 20 MG tablet Take 20 mg by mouth       losartan (COZAAR) 100 MG tablet Take 100 mg by mouth       blood glucose monitoring (ACCU-CHEK ACTIVE STRIPS) test strip        Blood Glucose Monitoring Suppl ([facility] BLOOD GLUCOSE MONITOR) W/DEVICE KIT        glimepiride (AMARYL) 1 MG tablet Take 1 tablet (1 mg) by mouth every morning (before breakfast) 90 tablet 3     metFORMIN (GLUCOPHAGE) 1000 MG tablet Take 1 tablet (1,000 mg) by mouth 2 times daily (with meals) 180 tablet 3     aspirin 81 MG chewable tablet Take 1 tablet (81 mg) by mouth daily 108 tablet 3     Blood Pressure Monitoring (BLOOD PRESSURE KIT) KIT 1 kit 2 times daily 1 kit 0     glucose blood VI test strips strip 1 Box by Device route 3 times daily. Please dispense three month supply with three refills of whatever test strip Angella needs.  Thank you. 3 Month 3     atorvastatin (LIPITOR) 10 MG tablet Take 1 tablet (10 mg) by mouth daily (Patient not taking: Reported on 7/6/2017) 90 tablet 3  "    Allergies   Allergen Reactions     Nifedipine      PN: LW Reaction: ankle swelling     Nkda [No Known Drug Allergies]      No Clinical Screening - See Comments      PN: LW CM1: >>> NO CONTRAST ADVERSE REACTION <<<     Reaction :     Latex Rash         Review of Systems:  -Skin: As above in HPI. No additional skin concerns.  -Const: The patient is generally feeling well today.     Physical exam:  Vitals: There were no vitals taken for this visit.  GEN: This is a well developed, well-nourished female in no acute distress, in a pleasant mood.     SKIN: Sun-exposed skin, which includes the head/face, neck, both arms, digits, legs and/or nails was examined. Exam of chest, abdomen and back performed. Also examined bilateral legs from knees to ankles. She declines further exam of areas.  - Numerous waxy brown, papules of various sizes in the inframamillary folds, on entire back  and right popliteal fossa, one on L upper chest.   -abdomen and lowerback almost completely spared  - On the upper right back and L shoulder healed hypopigmented atrophic scars. No signs of recurrence of prior BCCs  - bilateral LEs with +2 edema and poorly defined circumfrential erythematous pink patches without secondary changes  - No other lesions of concern on areas examined.     Impression/Plan:  1. History of nonmelanoma skin cancer, no clincial evidence of recurrence. ED&C site from recent BCCs have healed well.    Annual skin checks    Patient advised on the use of sun protection and monitoring for any new/changing lesions  2. Stasis dermatitis    - encouraged Angella to resume diligent compression stocking application daily, as well as continued triamcinolone 0.1% ointment use while the skin is red and itchy- swithcing to moisturizer when the redness and symptoms have resolved.   3. SKs- including the \"concerning lesion\" on the R upper chest- reassured of benign nature and that no treatment is required. She declined cosmetic treatment " of these lesions    RTC 3-6 months for stasis derm   This patient was seen and staffed with Dr. Freed,  Dermatology attending.     Laura Seay MD  Medicine/Dermatology, PGY-5  (p) [phone]

## 2017-07-06 NOTE — LETTER
7/6/2017       RE: Angella Ward  [address]   [facility] [address]     Dear Colleague,    Thank you for referring your patient, Angella Ward, to the [facility] DERMATOLOGY at [facility]. Please see a copy of my visit note below.    [facility] Dermatology Note      Dermatology Problem List:  1. History of NMSC  -BCC, left upper arm, s/p ED&C 10/27/2016  -SCCIS, right forearm s/p excision 10/2014  -Superficial BCC, mid inferior back, s/p ED&C.    -Superficial and nodular BCC, mid superior back, s/p excision 10/02/2014.     2. Actinic keratoses    Encounter Date: Jul 6, 2017    CC:  Chief Complaint   Patient presents with     Derm Problem     Angella is here today for a skin check.  States she has a concerning area on her neck and reddness on her left leg.         History of Present Illness:  Ms. Angella Ward is a 83 year old female who presents after one month for follow up of statis dermatitis . She was prescribed triamcinolone 0.1% ointment twice daily. She hasnt been wearing compression stockings as frequently as she's been focusing on trying  to get the triamcinolone on more frequently. Also notes a rough spot on her left chest that worries her. Its not painful or bleeding.     Past Medical History:   Patient Active Problem List   Diagnosis     Hyperlipidemia LDL goal <100     Colitis     Rotator cuff syndrome     Advanced directives, counseling/discussion     Hypertension goal BP (blood pressure) < 140/90     Adhesive capsulitis of left shoulder     CKD (chronic kidney disease) stage 3, GFR 30-59 ml/min     Chronic pain in shoulder     Arthritis of foot, left     Basal cell carcinoma in situ of skin of shoulder     Seborrheic keratosis     Toe pain, right     Glenohumeral arthritis     Osteoporosis     Obesity     Balance problems     Skin lesion     Limitation of joint motion of shoulder     SOB (shortness of breath)      Chronic diarrhea     Hard of hearing, right     Type 2 diabetes mellitus with diabetic nephropathy (H)     Microalbuminuric diabetic nephropathy (H)     Edema of left lower extremity     CKD (chronic kidney disease) stage 4, GFR 15-29 ml/min (H)     Past Medical History:   Diagnosis Date     Basal cell carcinoma      Chronic pain in shoulder 9/28/2012     CKD (chronic kidney disease) stage 3, GFR 30-59 ml/min 6/1/2012     Glenohumeral arthritis 6/10/2013    Noted on xray Angelito, has rec'd left shoulder injections 7/13/10, see scan     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 The patient denies use of tanning beds or sun exposure.     Family History:  Not pertinent to this visit.     Medications:  Current Outpatient Prescriptions   Medication Sig Dispense Refill     VITAMIN D, CHOLECALCIFEROL, PO Take 1,000 Units by mouth daily       triamcinolone (KENALOG) 0.1 % ointment Apply topically 2 times daily 80 g 1     COMPRESSION STOCKINGS Please dispense 2 pairs of customized compression stockings 2 each 3     furosemide (LASIX) 20 MG tablet Take 20 mg by mouth       losartan (COZAAR) 100 MG tablet Take 100 mg by mouth       blood glucose monitoring (ACCU-CHEK ACTIVE STRIPS) test strip        Blood Glucose Monitoring Suppl ([facility] BLOOD GLUCOSE MONITOR) W/DEVICE KIT        glimepiride (AMARYL) 1 MG tablet Take 1 tablet (1 mg) by mouth every morning (before breakfast) 90 tablet 3     metFORMIN (GLUCOPHAGE) 1000 MG tablet Take 1 tablet (1,000 mg) by mouth 2 times daily (with meals) 180 tablet 3     aspirin 81 MG chewable tablet Take 1 tablet (81 mg) by mouth daily 108 tablet 3     Blood Pressure Monitoring (BLOOD PRESSURE KIT) KIT 1 kit 2 times daily 1 kit 0     glucose blood VI  test strips strip 1 Box by Device route 3 times daily. Please dispense three month supply with three refills of whatever test strip Angella needs.  Thank you. 3 Month 3     atorvastatin (LIPITOR) 10 MG tablet Take 1 tablet (10 mg) by mouth daily (Patient not taking: Reported on 7/6/2017) 90 tablet 3     Allergies   Allergen Reactions     Nifedipine      PN: LW Reaction: ankle swelling     Nkda [No Known Drug Allergies]      No Clinical Screening - See Comments      PN: LW CM1: >>> NO CONTRAST ADVERSE REACTION <<<     Reaction :     Latex Rash         Review of Systems:  -Skin: As above in HPI. No additional skin concerns.  -Const: The patient is generally feeling well today.     Physical exam:  Vitals: There were no vitals taken for this visit.  GEN: This is a well developed, well-nourished female in no acute distress, in a pleasant mood.     SKIN: Sun-exposed skin, which includes the head/face, neck, both arms, digits, legs and/or nails was examined. Exam of chest, abdomen and back performed. Also examined bilateral legs from knees to ankles. She declines further exam of areas.  - Numerous waxy brown, papules of various sizes in the inframamillary folds, on entire back  and right popliteal fossa, one on L upper chest.   -abdomen and lowerback almost completely spared  - On the upper right back and L shoulder healed hypopigmented atrophic scars. No signs of recurrence of prior BCCs  - bilateral LEs with +2 edema and poorly defined circumfrential erythematous pink patches without secondary changes  - No other lesions of concern on areas examined.     Impression/Plan:  1. History of nonmelanoma skin cancer, no clincial evidence of recurrence. ED&C site from recent BCCs have healed well.    Annual skin checks    Patient advised on the use of sun protection and monitoring for any new/changing lesions  2. Stasis dermatitis    - encouraged Angella to resume diligent compression stocking application daily, as well as  "continued triamcinolone 0.1% ointment use while the skin is red and itchy- swithcing to moisturizer when the redness and symptoms have resolved.   3. SKs- including the \"concerning lesion\" on the R upper chest- reassured of benign nature and that no treatment is required. She declined cosmetic treatment of these lesions    RTC 3-6 months for stasis derm   This patient was seen and staffed with Dr. Freed,  Dermatology attending.     Laura Seay MD  Medicine/Dermatology, PGY-5  (p) [phone]          I talked with and examined Angella Ward and I agree with the assessment and the plan. HS MD Abdiaziz.          "

## 2017-07-06 NOTE — MR AVS SNAPSHOT
After Visit Summary   7/6/2017    Angella Ward    MRN: [id number]           Patient Information     Date Of Birth          1/7/1934        Visit Information        Provider Department      7/6/2017 10:00 AM Laura Seay MD [facility] Dermatology        Today's Diagnoses     Stasis dermatitis of both legs           Follow-ups after your visit        Your next 10 appointments already scheduled     Aug 21, 2017 11:00 AM CDT   (Arrive by 10:45 AM)   RETURN ENDOCRINE with Dalila Thayer MD   [facility] Endocrinology ([facility])    [address] [place] [id number] [phone]            Oct 11, 2017  2:00 PM CDT   (Arrive by 1:45 PM)   Return Visit with Risa Freeman PA-C   [facility] Dermatology ([facility])    [address] [place] [address] [phone]              Who to contact     Please call your clinic at [phone] to:    Ask questions about your health    Make or cancel appointments    Discuss your medicines    Learn about your test results    Speak to your doctor   If you have compliments or concerns about an experience at your clinic, or if you wish to file a complaint, please contact [facility] Patient Relations at [phone] or email us at [email or web address]         Additional Information About Your Visit        Care EveryWhere ID     This is your Care EveryWhere ID. This could be used by other organizations to access your [facility] medical records  [id number]         Blood Pressure from Last 3 Encounters:   02/20/17 131/85   08/15/16 150/90   05/16/16 142/87    Weight from Last 3 Encounters:   02/20/17 63.7 kg (140 lb 8 oz)   08/15/16 64.9 kg (143 lb)   05/16/16 64.4 kg (142 lb)              Today, you had the following     No orders found for display         Today's Medication Changes          These changes are  accurate as of: 7/6/17 11:06 AM.  If you have any questions, ask your nurse or doctor.               These medicines have changed or have updated prescriptions.        Dose/Directions    triamcinolone 0.1 % ointment   Commonly known as:  KENALOG   This may have changed:    - how to take this  - when to take this  - additional instructions   Used for:  Stasis dermatitis of both legs   Changed by:  Laura Seay MD        Apply to lower leg rash nightly (cover with socks)   Quantity:  80 g   Refills:  3            Where to get your medicines      These medications were sent to [facility] #[id number] - [place], [place] - [address]  [address] [facility] [address]     Phone:  [phone]     triamcinolone 0.1 % ointment                Primary Care Provider Office Phone # Fax #    [facility] [phone] [address] [facility] [address]        Equal Access to Services     MEET[facility] AH: Hadii aad ku hadasho Soomaali, waaxda luqadaha, qaybta kaalmada adeegyada, waxay idiin hayrajin iveth ko . So [facility] [phone].    ATENCIÓN: Si habla español, tiene a lundberg disposición servicios gratuitos de asistencia lingüística. [facility] [phone].    We comply with applicable federal civil rights laws and [place] laws. We do not discriminate on the basis of race, color, national origin, age, disability sex, sexual orientation or gender identity.            Thank you!     Thank you for choosing [facility] DERMATOLOGY  for your care. Our goal is always to provide you with excellent care. Hearing back from our patients is one way we can continue to improve our services. Please take a few minutes to complete the written survey that you may receive in the mail after your visit with us. Thank you!             Your Updated Medication List - Protect others around you: Learn how to safely use, store and throw away your medicines at www.disposemymeds.org.          This list  is accurate as of: 7/6/17 11:06 AM.  Always use your most recent med list.                   Brand Name Dispense Instructions for use Diagnosis    aspirin 81 MG chewable tablet     108 tablet    Take 1 tablet (81 mg) by mouth daily    Type 2 diabetes, HbA1C goal < 8% (H)       atorvastatin 10 MG tablet    LIPITOR    90 tablet    Take 1 tablet (10 mg) by mouth daily    Type 2 diabetes mellitus with diabetic nephropathy (H)       * blood glucose monitoring test strip    no brand specified    3 Month    1 Box by Device route 3 times daily. Please dispense three month supply with three refills of whatever test strip Angella needs.  Thank you.    Type 2 diabetes, HbA1c goal < 7% (H)       * ACCU-CHEK ACTIVE STRIPS test strip   Generic drug:  blood glucose monitoring           blood pressure kit Kit     1 kit    1 kit 2 times daily    Hypertension goal BP (blood pressure) < 140/90       COMPRESSION STOCKINGS     2 each    Please dispense 2 pairs of customized compression stockings    Chronic kidney disease, stage III (moderate), Bilateral leg edema, Type 2 diabetes mellitus with diabetic nephropathy, without long-term current use of insulin (H)       furosemide 20 MG tablet    LASIX     Take 20 mg by mouth        GHT BLOOD GLUCOSE MONITOR W/DEVICE Kit           glimepiride 1 MG tablet    AMARYL    90 tablet    Take 1 tablet (1 mg) by mouth every morning (before breakfast)    Type 2 diabetes mellitus with diabetic nephropathy (H)       losartan 100 MG tablet    COZAAR     Take 100 mg by mouth        metFORMIN 1000 MG tablet    GLUCOPHAGE    180 tablet    Take 1 tablet (1,000 mg) by mouth 2 times daily (with meals)    Type 2 diabetes, HbA1C goal < 8% (H)       triamcinolone 0.1 % ointment    KENALOG    80 g    Apply to lower leg rash nightly (cover with socks)    Stasis dermatitis of both legs       VITAMIN D (CHOLECALCIFEROL) PO      Take 1,000 Units by mouth daily        * Notice:  This list has 2 medication(s) that are  the same as other medications prescribed for you. Read the directions carefully, and ask your doctor or other care provider to review them with you.

## 2017-07-06 NOTE — NURSING NOTE
Dermatology Rooming Note    Angella Ward's goals for this visit include:   Chief Complaint   Patient presents with     Derm Problem     Angella is here today for a skin check.  States she has a concerning area on her neck and reddness on her left leg.         Johanna Piedra LPN

## 2017-07-06 NOTE — PROGRESS NOTES
I talked with and examined Angella Ward and I agree with the assessment and the plan. DEANNE Freed MD.

## 2017-08-21 ENCOUNTER — OFFICE VISIT (OUTPATIENT)
Dept: ENDOCRINOLOGY | Facility: CLINIC | Age: 82
End: 2017-08-21

## 2017-08-21 VITALS
DIASTOLIC BLOOD PRESSURE: 95 MMHG | WEIGHT: 137.4 LBS | HEART RATE: 75 BPM | BODY MASS INDEX: 28.72 KG/M2 | SYSTOLIC BLOOD PRESSURE: 171 MMHG

## 2017-08-21 DIAGNOSIS — N18.30 CKD (CHRONIC KIDNEY DISEASE) STAGE 3, GFR 30-59 ML/MIN (H): ICD-10-CM

## 2017-08-21 DIAGNOSIS — I10 BENIGN ESSENTIAL HYPERTENSION: Primary | ICD-10-CM

## 2017-08-21 DIAGNOSIS — E11.21 TYPE 2 DIABETES MELLITUS WITH DIABETIC NEPHROPATHY, WITHOUT LONG-TERM CURRENT USE OF INSULIN (H): ICD-10-CM

## 2017-08-21 DIAGNOSIS — M81.8 OTHER OSTEOPOROSIS WITHOUT CURRENT PATHOLOGICAL FRACTURE: ICD-10-CM

## 2017-08-21 RX ORDER — SAW/VIT E/SOD SEL/LYC/BETA/PYG 160-100
1 TABLET ORAL DAILY
COMMUNITY

## 2017-08-21 RX ORDER — CARVEDILOL 6.25 MG/1
6.25 TABLET ORAL 2 TIMES DAILY WITH MEALS
Qty: 180 TABLET | Refills: 3 | Status: SHIPPED | OUTPATIENT
Start: 2017-08-21

## 2017-08-21 ASSESSMENT — PAIN SCALES - GENERAL: PAINLEVEL: NO PAIN (0)

## 2017-08-21 NOTE — NURSING NOTE
"Chief Complaint   Patient presents with     RECHECK     type 2 diabetes f/u        Initial BP (!) 171/95 (BP Location: Right arm, Patient Position: Sitting, Cuff Size: Adult Regular)  Pulse 75  Wt 62.3 kg (137 lb 6.4 oz)  BMI 28.72 kg/m2 Estimated body mass index is 28.72 kg/(m^2) as calculated from the following:    Height as of 2/20/17: 1.473 m (4' 10\").    Weight as of this encounter: 62.3 kg (137 lb 6.4 oz).  Medication Reconciliation: complete         "
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
## 2017-08-21 NOTE — MR AVS SNAPSHOT
After Visit Summary   8/21/2017    Angella Ward    MRN: [id number]           Patient Information     Date Of Birth          1/7/1934        Visit Information        Provider Department      8/21/2017 11:00 AM Dalila Thayer MD [facility]        Today's Diagnoses     Benign essential hypertension    -  1      Care Instructions    Please have labwork done in the morning, around 8-10 AM.           Follow-ups after your visit        Follow-up notes from your care team     Return in about 5 months (around 1/21/2018).      Your next 10 appointments already scheduled     Oct 11, 2017  2:00 PM CDT   (Arrive by 1:45 PM)   Return Visit with KELSEY Dang [facility] ([facility])    [address] [place] [id number] [phone]              Future tests that were ordered for you today     Open Future Orders        Priority Expected Expires Ordered    Adrenal corticotropin Routine 8/30/2017 8/21/2018 8/21/2017    Cortisol Routine 8/30/2017 8/21/2018 8/21/2017            Who to contact     Please call your clinic at [phone] to:    Ask questions about your health    Make or cancel appointments    Discuss your medicines    Learn about your test results    Speak to your doctor   If you have compliments or concerns about an experience at your clinic, or if you wish to file a complaint, please contact [facility] Patient Relations at [phone] or email us at [email or web address]         Additional Information About Your Visit        Care EveryWhere ID     This is your Care EveryWhere ID. This could be used by other organizations to access your [place] medical records  [id number]        Your Vitals Were     Pulse BMI (Body Mass Index)                75 28.72 kg/m2           Blood Pressure from Last 3 Encounters:   08/21/17 (!) 171/95   02/20/17 131/85   08/15/16 150/90     Weight from Last 3 Encounters:   08/21/17 62.3 kg (137 lb 6.4 oz)   02/20/17 63.7 kg (140 lb 8 oz)   08/15/16 64.9 kg (143 lb)                 Today's Medication Changes          These changes are accurate as of: 8/21/17  1:07 PM.  If you have any questions, ask your nurse or doctor.               Start taking these medicines.        Dose/Directions    carvedilol 6.25 MG tablet   Commonly known as:  COREG   Used for:  Benign essential hypertension        Dose:  6.25 mg   Take 1 tablet (6.25 mg) by mouth 2 times daily (with meals)   Quantity:  180 tablet   Refills:  3            Where to get your medicines      These medications were sent to [id number] - [place] [facility] [address]  [address] [facility] [address]     Phone:  [phone]     carvedilol 6.25 MG tablet                Primary Care Provider Office Phone # Fax #    Aravind Aspen [facility] [phone]       [address] [facility] [address]        Equal Access to Services     [facility] HospitalINDIRA : Hadii kevin ku hadasho Sojostin, waaxda luqadaha, qaybta kaalmathomas macias, fabián rodas. So [facility] [phone].    ATENCIÓN: Si habla español, tiene a lundberg disposición servicios gratuitos de asistencia lingüística. Trudy al [phone].    We comply with applicable federal civil rights laws and [place] laws. We do not discriminate on the basis of race, color, national origin, age, disability sex, sexual orientation or gender identity.            Thank you!     Thank you for choosing [facility]  for your care. Our goal is always to provide you with excellent care. Hearing back from our patients is one way we can continue to improve our services. Please take a few minutes to complete the written survey that you may receive in the mail after your visit with us. Thank you!             Your Updated Medication List - Protect others around you: Learn how to safely use, store and throw away  your medicines at www.disposemymeds.org.          This list is accurate as of: 8/21/17  1:07 PM.  Always use your most recent med list.                   Brand Name Dispense Instructions for use Diagnosis    aspirin 81 MG chewable tablet     108 tablet    Take 1 tablet (81 mg) by mouth daily    Type 2 diabetes, HbA1C goal < 8% (H)       atorvastatin 10 MG tablet    LIPITOR    90 tablet    Take 1 tablet (10 mg) by mouth daily    Type 2 diabetes mellitus with diabetic nephropathy (H)       * blood glucose monitoring test strip    no brand specified    3 Month    1 Box by Device route 3 times daily. Please dispense three month supply with three refills of whatever test strip Angella needs.  Thank you.    Type 2 diabetes, HbA1c goal < 7% (H)       * ACCU-CHEK ACTIVE STRIPS test strip   Generic drug:  blood glucose monitoring           blood pressure kit Kit     1 kit    1 kit 2 times daily    Hypertension goal BP (blood pressure) < 140/90       carvedilol 6.25 MG tablet    COREG    180 tablet    Take 1 tablet (6.25 mg) by mouth 2 times daily (with meals)    Benign essential hypertension       COMPRESSION STOCKINGS     2 each    Please dispense 2 pairs of customized compression stockings    Chronic kidney disease, stage III (moderate), Bilateral leg edema, Type 2 diabetes mellitus with diabetic nephropathy, without long-term current use of insulin (H)       furosemide 20 MG tablet    LASIX     Take 80 mg by mouth        GHT BLOOD GLUCOSE MONITOR W/DEVICE Kit           glimepiride 1 MG tablet    AMARYL    90 tablet    Take 1 tablet (1 mg) by mouth every morning (before breakfast)    Type 2 diabetes mellitus with diabetic nephropathy (H)       losartan 100 MG tablet    COZAAR     Take 100 mg by mouth        metFORMIN 1000 MG tablet    GLUCOPHAGE    180 tablet    Take 1 tablet (1,000 mg) by mouth 2 times daily (with meals)    Type 2 diabetes, HbA1C goal < 8% (H)       PROBIOTIC & ACIDOPHILUS EX ST Caps      Take 1 capsule  by mouth daily        triamcinolone 0.1 % ointment    KENALOG    80 g    Apply to lower leg rash nightly (cover with socks)    Stasis dermatitis of both legs       VITAMIN D (CHOLECALCIFEROL) PO      Take 1,000 Units by mouth daily        * Notice:  This list has 2 medication(s) that are the same as other medications prescribed for you. Read the directions carefully, and ask your doctor or other care provider to review them with you.

## 2017-08-22 ENCOUNTER — TRANSFERRED RECORDS (OUTPATIENT)
Dept: HEALTH INFORMATION MANAGEMENT | Facility: CLINIC | Age: 82
End: 2017-08-22

## 2017-08-22 LAB — HBA1C MFR BLD: 6.8 % (ref 4.3–6)

## 2017-08-28 ENCOUNTER — TELEPHONE (OUTPATIENT)
Dept: ENDOCRINOLOGY | Facility: CLINIC | Age: 82
End: 2017-08-28

## 2017-08-28 NOTE — TELEPHONE ENCOUNTER
Angella was notified that her  Labs were normal . SHe is still thinking about the prolia injection once every 6 months for osteoporosis.

## 2017-09-11 DIAGNOSIS — E11.21 TYPE 2 DIABETES MELLITUS WITH DIABETIC NEPHROPATHY (H): ICD-10-CM

## 2017-09-14 RX ORDER — GLIMEPIRIDE 1 MG/1
1 TABLET ORAL
Qty: 90 TABLET | Refills: 3 | Status: SHIPPED | OUTPATIENT
Start: 2017-09-14 | End: 2018-09-22

## 2017-10-03 PROBLEM — I87.2 STASIS DERMATITIS OF BOTH LEGS: Status: ACTIVE | Noted: 2017-10-03

## 2017-10-11 ENCOUNTER — OFFICE VISIT (OUTPATIENT)
Dept: DERMATOLOGY | Facility: CLINIC | Age: 82
End: 2017-10-11

## 2017-10-11 DIAGNOSIS — I87.2 VENOUS STASIS DERMATITIS OF LEFT LOWER EXTREMITY: Primary | ICD-10-CM

## 2017-10-11 RX ORDER — FLUOCINONIDE 0.5 MG/G
OINTMENT TOPICAL AT BEDTIME
Qty: 60 G | Refills: 1 | Status: SHIPPED | OUTPATIENT
Start: 2017-10-11

## 2017-10-11 ASSESSMENT — PAIN SCALES - GENERAL: PAINLEVEL: NO PAIN (0)

## 2017-10-11 NOTE — NURSING NOTE
Dermatology Rooming Note    Angella Ward's goals for this visit include:   Chief Complaint   Patient presents with     Derm Problem     Stasis dermatitis on legs. Angella is using the triamcinolone and she notes no improvement.     Marguerite Rojas, CMA

## 2017-10-11 NOTE — LETTER
10/11/2017       RE: Angella Ward  [address]   [facility] [address]     Dear Colleague,    Thank you for referring your patient, Angella Ward, to the [facility] DERMATOLOGY at [facility]. Please see a copy of my visit note below.    [facility] Dermatology Note    Dermatology Problem List:  1. History of NMSC  -BCC, left upper arm, s/p ED&C 10/27/2016  -SCCIS, right forearm s/p excision 10/2014  -Superficial BCC, mid inferior back, s/p ED&C.    -Superficial and nodular BCC, mid superior back, s/p excision 10/02/2014.     2. Actinic keratoses  3. Stasis dermatitis  - s/p triamcinolone 0.1% ointment  - compression stockings, fluocinonide 0.05% ointment  4. Skin cancer screening 7/06/2017    Encounter Date: Oct 11, 2017    CC:  Chief Complaint   Patient presents with     Derm Problem     Stasis dermatitis on legs. Angella is using the triamcinolone and she notes no improvement.     History of Present Illness:  Ms. Angella Ward is a 83 year old female who presents for a stasis dermatitis follow up. The patient was last seen on 7/6/17 by Dr. Seay when she was encouraged to continue her compression stockings and continued triamcinolone 0.1% ointment as well as moisturization. Today the patient reports that her legs are stable and there is no improvement. She notes that she has been using the triamcinolone ointment once daily and states that she did not wear her compression stocks because she wanted her legs to be more easily examined. She normally wears her compression stockings daily. She notes that her right leg is doing great. She notes that since the last appointment she increased the amount of diuretic medications that she is on. She notes that she spends a lot of time in the restroom due to this increase. The patient reports no other lesions of concern at this time.     Otherwise, the patient reports no painful, bleeding,  nonhealing, or pruritic lesions, and denies new or changing moles.    Past Medical History:   Patient Active Problem List   Diagnosis     Hyperlipidemia LDL goal <100     Colitis     Rotator cuff syndrome     Advanced directives, counseling/discussion     Hypertension goal BP (blood pressure) < 140/90     Adhesive capsulitis of left shoulder     CKD (chronic kidney disease) stage 3, GFR 30-59 ml/min     Chronic pain in shoulder     Arthritis of foot, left     Basal cell carcinoma in situ of skin of shoulder     Seborrheic keratosis     Toe pain, right     Glenohumeral arthritis     Osteoporosis     Obesity     Balance problems     Skin lesion     Limitation of joint motion of shoulder     SOB (shortness of breath)     Chronic diarrhea     Hard of hearing, right     Type 2 diabetes mellitus with diabetic nephropathy (H)     Microalbuminuric diabetic nephropathy (H)     Edema of left lower extremity     CKD (chronic kidney disease) stage 4, GFR 15-29 ml/min (H)     Stasis dermatitis of both legs     Past Medical History:   Diagnosis Date     Basal cell carcinoma      Chronic pain in shoulder 9/28/2012     CKD (chronic kidney disease) stage 3, GFR 30-59 ml/min 6/1/2012     Glenohumeral arthritis 6/10/2013    Noted on xray Angelito, has rec'd left shoulder injections 7/13/10, see scan     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 The patient denies use of tanning beds or sun exposure.     Family History:  Not pertinent to this visit.     Medications:  Current Outpatient Prescriptions   Medication Sig Dispense Refill     glimepiride (AMARYL) 1 MG tablet Take 1 tablet (1 mg) by mouth every morning (before breakfast) 90 tablet 3     Probiotic Product (PROBIOTIC & ACIDOPHILUS EX ST)  CAPS Take 1 capsule by mouth daily       carvedilol (COREG) 6.25 MG tablet Take 1 tablet (6.25 mg) by mouth 2 times daily (with meals) 180 tablet 3     triamcinolone (KENALOG) 0.1 % ointment Apply to lower leg rash nightly (cover with socks) 80 g 3     VITAMIN D, CHOLECALCIFEROL, PO Take 1,000 Units by mouth daily       COMPRESSION STOCKINGS Please dispense 2 pairs of customized compression stockings 2 each 3     furosemide (LASIX) 20 MG tablet Take 80 mg by mouth        losartan (COZAAR) 100 MG tablet Take 100 mg by mouth       blood glucose monitoring (ACCU-CHEK ACTIVE STRIPS) test strip        Blood Glucose Monitoring Suppl ([facility] BLOOD GLUCOSE MONITOR) W/DEVICE KIT        atorvastatin (LIPITOR) 10 MG tablet Take 1 tablet (10 mg) by mouth daily 90 tablet 3     metFORMIN (GLUCOPHAGE) 1000 MG tablet Take 1 tablet (1,000 mg) by mouth 2 times daily (with meals) 180 tablet 3     aspirin 81 MG chewable tablet Take 1 tablet (81 mg) by mouth daily 108 tablet 3     Blood Pressure Monitoring (BLOOD PRESSURE KIT) KIT 1 kit 2 times daily 1 kit 0     glucose blood VI test strips strip 1 Box by Device route 3 times daily. Please dispense three month supply with three refills of whatever test strip Angella needs.  Thank you. 3 Month 3     Allergies   Allergen Reactions     Nifedipine Swelling     PN: LW Reaction: ankle swelling     Nkda [No Known Drug Allergies]      No Clinical Screening - See Comments      PN: LW CM1: >>> NO CONTRAST ADVERSE REACTION <<<     Reaction :     Latex Rash     Review of Systems:  - Skin: As above in HPI. No additional skin concerns.  - Const: The patient is generally feeling well today.     Physical exam:  Vitals: There were no vitals taken for this visit.  GEN: This is a well developed, well-nourished female in no acute distress, in a pleasant mood.     SKIN: Focused examination of the bilateral lower legs was performed.  - 1+ pitting edema to left lower leg with mild erythema  - Brawny brown patch  noted to anterior lower leg, evidence of chronic venous insufficiency   - No other lesions of concern on areas examined.     Impression/Plan:  1. History of nonmelanoma skin cancer  - No clincial evidence of recurrence.   -Next screening due 7/18    2. Stasis dermatitis  - Stop triamcinolone 0.1% ointment use while the skin is red and itchy  - Start fluocinonide 0.05% ointment - apply to affected areas twice daily until red areas resolve, then reduce use of medication and increase moisturization.  - Continue good moisturization   - Continue diligent compression stocking application daily    Follow up in 3 months, earlier for new or changing lesions.     Staff Involved:  Scribe Disclosure:   I, Mei Yee, am serving as a scribe to document services personally performed by Risa Freeman PA-C, based on data collection and the provider's statements to me.    Provider Disclosure:   The documentation recorded by the scribe accurately reflects the services I personally performed and the decisions made by me.    All risks, benefits and alternatives were discussed with patient.  Patient is in agreement and understands the assessment and plan.  All questions were answered.  Sun Screen Education was given.   Return to Clinic in 3 months or sooner as needed.   Risa Freeman PA-C   [facility]ermat[facility]

## 2017-10-11 NOTE — MR AVS SNAPSHOT
After Visit Summary   10/11/2017    Angella Ward    MRN: [id number]           Patient Information     Date Of Birth          1/7/1934        Visit Information        Provider Department      10/11/2017 2:00 PM Risa Freeman PA-C [facility]        Today's Diagnoses     Venous stasis dermatitis of left lower extremity    -  1       Follow-ups after your visit        Follow-up notes from your care team     Return in about 3 months (around 1/11/2018).      Your next 10 appointments already scheduled     Jan 11, 2018 12:00 PM CST   (Arrive by 11:45 AM)   Return Visit with [facility] ([facility] [place])    [address] [address] [phone]              Who to contact     Please call your clinic at [phone] to:    Ask questions about your health    Make or cancel appointments    Discuss your medicines    Learn about your test results    Speak to your doctor   If you have compliments or concerns about an experience at your clinic, or if you wish to file a complaint, please contact [facility] Patient Relations at [phone] or email us at [email or web address]         Additional Information About Your Visit        Care EveryWhere ID     This is your Care EveryWhere ID. This could be used by other organizations to access your [place] medical records  [id number]         Blood Pressure from Last 3 Encounters:   08/21/17 (!) 171/95   02/20/17 131/85   08/15/16 150/90    Weight from Last 3 Encounters:   08/21/17 62.3 kg (137 lb 6.4 oz)   02/20/17 63.7 kg (140 lb 8 oz)   08/15/16 64.9 kg (143 lb)              Today, you had the following     No orders found for display         Today's Medication Changes          These changes are accurate as of: 10/11/17  2:44 PM.  If you have any questions, ask your nurse or doctor.               Start taking these  medicines.        Dose/Directions    fluocinonide 0.05 % ointment   Commonly known as:  LIDEX   Used for:  Venous stasis dermatitis of left lower extremity   Started by:  Risa Freeman PA-C        Apply topically At Bedtime   Quantity:  60 g   Refills:  1            Where to get your medicines      These medications were sent to [facility] #[id number] - [place], [place] - [address]  [address], [facility] [address]     Phone:  [phone]     fluocinonide 0.05 % ointment                Primary Care Provider Office Phone # Fax #    [facility] [phone] [address] [facility] [address]        Equal Access to Services     MEETA ANDRE : Hadii kevin ku hadasho Soomaali, waaxda luqadaha, qaybta kaalmada adeegyada, waxay mj ko . So [facility] [phone].    ATENCIÓN: Si habla español, tiene a lundberg disposición servicios gratuitos de asistencia lingüística. [facility] [phone].    We comply with applicable federal civil rights laws and [place] laws. We do not discriminate on the basis of race, color, national origin, age, disability, sex, sexual orientation, or gender identity.            Thank you!     Thank you for choosing [facility] DERMATOLOGY  for your care. Our goal is always to provide you with excellent care. Hearing back from our patients is one way we can continue to improve our services. Please take a few minutes to complete the written survey that you may receive in the mail after your visit with us. Thank you!             Your Updated Medication List - Protect others around you: Learn how to safely use, store and throw away your medicines at www.disposemymeds.org.          This list is accurate as of: 10/11/17  2:44 PM.  Always use your most recent med list.                   Brand Name Dispense Instructions for use Diagnosis    aspirin 81 MG chewable tablet     108 tablet    Take 1 tablet (81 mg) by mouth daily    Type 2  diabetes, HbA1C goal < 8% (H)       atorvastatin 10 MG tablet    LIPITOR    90 tablet    Take 1 tablet (10 mg) by mouth daily    Type 2 diabetes mellitus with diabetic nephropathy (H)       * blood glucose monitoring test strip    no brand specified    3 Month    1 Box by Device route 3 times daily. Please dispense three month supply with three refills of whatever test strip Angella needs.  Thank you.    Type 2 diabetes, HbA1c goal < 7% (H)       * ACCU-CHEK ACTIVE STRIPS test strip   Generic drug:  blood glucose monitoring           blood pressure kit Kit     1 kit    1 kit 2 times daily    Hypertension goal BP (blood pressure) < 140/90       carvedilol 6.25 MG tablet    COREG    180 tablet    Take 1 tablet (6.25 mg) by mouth 2 times daily (with meals)    Benign essential hypertension       COMPRESSION STOCKINGS     2 each    Please dispense 2 pairs of customized compression stockings    Chronic kidney disease, stage III (moderate), Bilateral leg edema, Type 2 diabetes mellitus with diabetic nephropathy, without long-term current use of insulin (H)       fluocinonide 0.05 % ointment    LIDEX    60 g    Apply topically At Bedtime    Venous stasis dermatitis of left lower extremity       furosemide 20 MG tablet    LASIX     Take 80 mg by mouth        GHT BLOOD GLUCOSE MONITOR W/DEVICE Kit           glimepiride 1 MG tablet    AMARYL    90 tablet    Take 1 tablet (1 mg) by mouth every morning (before breakfast)    Type 2 diabetes mellitus with diabetic nephropathy (H)       losartan 100 MG tablet    COZAAR     Take 100 mg by mouth        metFORMIN 1000 MG tablet    GLUCOPHAGE    180 tablet    Take 1 tablet (1,000 mg) by mouth 2 times daily (with meals)    Type 2 diabetes, HbA1C goal < 8% (H)       PROBIOTIC & ACIDOPHILUS EX ST Caps      Take 1 capsule by mouth daily        triamcinolone 0.1 % ointment    KENALOG    80 g    Apply to lower leg rash nightly (cover with socks)    Stasis dermatitis of both legs        VITAMIN D (CHOLECALCIFEROL) PO      Take 1,000 Units by mouth daily        * Notice:  This list has 2 medication(s) that are the same as other medications prescribed for you. Read the directions carefully, and ask your doctor or other care provider to review them with you.

## 2017-10-11 NOTE — PROGRESS NOTES
[facility] Dermatology Note    Dermatology Problem List:  1. History of NMSC  -BCC, left upper arm, s/p ED&C 10/27/2016  -SCCIS, right forearm s/p excision 10/2014  -Superficial BCC, mid inferior back, s/p ED&C.    -Superficial and nodular BCC, mid superior back, s/p excision 10/02/2014.     2. Actinic keratoses  3. Stasis dermatitis  - s/p triamcinolone 0.1% ointment  - compression stockings, fluocinonide 0.05% ointment  4. Skin cancer screening 7/06/2017    Encounter Date: Oct 11, 2017    CC:  Chief Complaint   Patient presents with     Derm Problem     Stasis dermatitis on legs. Angella is using the triamcinolone and she notes no improvement.     History of Present Illness:  Ms. Angella Ward is a 83 year old female who presents for a stasis dermatitis follow up. The patient was last seen on 7/6/17 by Dr. Seay when she was encouraged to continue her compression stockings and continued triamcinolone 0.1% ointment as well as moisturization. Today the patient reports that her legs are stable and there is no improvement. She notes that she has been using the triamcinolone ointment once daily and states that she did not wear her compression stocks because she wanted her legs to be more easily examined. She normally wears her compression stockings daily. She notes that her right leg is doing great. She notes that since the last appointment she increased the amount of diuretic medications that she is on. She notes that she spends a lot of time in the restroom due to this increase. The patient reports no other lesions of concern at this time.     Otherwise, the patient reports no painful, bleeding, nonhealing, or pruritic lesions, and denies new or changing moles.    Past Medical History:   Patient Active Problem List   Diagnosis     Hyperlipidemia LDL goal <100     Colitis     Rotator cuff syndrome     Advanced directives, counseling/discussion     Hypertension goal BP (blood pressure) <  140/90     Adhesive capsulitis of left shoulder     CKD (chronic kidney disease) stage 3, GFR 30-59 ml/min     Chronic pain in shoulder     Arthritis of foot, left     Basal cell carcinoma in situ of skin of shoulder     Seborrheic keratosis     Toe pain, right     Glenohumeral arthritis     Osteoporosis     Obesity     Balance problems     Skin lesion     Limitation of joint motion of shoulder     SOB (shortness of breath)     Chronic diarrhea     Hard of hearing, right     Type 2 diabetes mellitus with diabetic nephropathy (H)     Microalbuminuric diabetic nephropathy (H)     Edema of left lower extremity     CKD (chronic kidney disease) stage 4, GFR 15-29 ml/min (H)     Stasis dermatitis of both legs     Past Medical History:   Diagnosis Date     Basal cell carcinoma      Chronic pain in shoulder 9/28/2012     CKD (chronic kidney disease) stage 3, GFR 30-59 ml/min 6/1/2012     Glenohumeral arthritis 6/10/2013    Noted on xray Angelito, has rec'd left shoulder injections 7/13/10, see scan     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 The patient denies use of tanning beds or sun exposure.     Family History:  Not pertinent to this visit.     Medications:  Current Outpatient Prescriptions   Medication Sig Dispense Refill     glimepiride (AMARYL) 1 MG tablet Take 1 tablet (1 mg) by mouth every morning (before breakfast) 90 tablet 3     Probiotic Product (PROBIOTIC & ACIDOPHILUS EX ST) CAPS Take 1 capsule by mouth daily       carvedilol (COREG) 6.25 MG tablet Take 1 tablet (6.25 mg) by mouth 2 times daily (with meals) 180 tablet 3     triamcinolone (KENALOG) 0.1 % ointment Apply to lower leg rash nightly (cover with socks) 80 g 3     VITAMIN D, CHOLECALCIFEROL, PO Take 1,000  Units by mouth daily       COMPRESSION STOCKINGS Please dispense 2 pairs of customized compression stockings 2 each 3     furosemide (LASIX) 20 MG tablet Take 80 mg by mouth        losartan (COZAAR) 100 MG tablet Take 100 mg by mouth       blood glucose monitoring (ACCU-CHEK ACTIVE STRIPS) test strip        Blood Glucose Monitoring Suppl ([facility] BLOOD GLUCOSE MONITOR) W/DEVICE KIT        atorvastatin (LIPITOR) 10 MG tablet Take 1 tablet (10 mg) by mouth daily 90 tablet 3     metFORMIN (GLUCOPHAGE) 1000 MG tablet Take 1 tablet (1,000 mg) by mouth 2 times daily (with meals) 180 tablet 3     aspirin 81 MG chewable tablet Take 1 tablet (81 mg) by mouth daily 108 tablet 3     Blood Pressure Monitoring (BLOOD PRESSURE KIT) KIT 1 kit 2 times daily 1 kit 0     glucose blood VI test strips strip 1 Box by Device route 3 times daily. Please dispense three month supply with three refills of whatever test strip Angella needs.  Thank you. 3 Month 3     Allergies   Allergen Reactions     Nifedipine Swelling     PN: LW Reaction: ankle swelling     Nkda [No Known Drug Allergies]      No Clinical Screening - See Comments      PN: LW CM1: >>> NO CONTRAST ADVERSE REACTION <<<     Reaction :     Latex Rash     Review of Systems:  - Skin: As above in HPI. No additional skin concerns.  - Const: The patient is generally feeling well today.     Physical exam:  Vitals: There were no vitals taken for this visit.  GEN: This is a well developed, well-nourished female in no acute distress, in a pleasant mood.     SKIN: Focused examination of the bilateral lower legs was performed.  - 1+ pitting edema to left lower leg with mild erythema  - Brawny brown patch noted to anterior lower leg, evidence of chronic venous insufficiency   - No other lesions of concern on areas examined.     Impression/Plan:  1. History of nonmelanoma skin cancer  - No clincial evidence of recurrence.   -Next screening due 7/18    2. Stasis dermatitis  - Stop triamcinolone  0.1% ointment use while the skin is red and itchy  - Start fluocinonide 0.05% ointment - apply to affected areas twice daily until red areas resolve, then reduce use of medication and increase moisturization.  - Continue good moisturization   - Continue diligent compression stocking application daily    Follow up in 3 months, earlier for new or changing lesions.     Staff Involved:  Scribe Disclosure:   I, Mei Yee, am serving as a scribe to document services personally performed by Risa Freeman PA-C, based on data collection and the provider's statements to me.    Provider Disclosure:   The documentation recorded by the scribe accurately reflects the services I personally performed and the decisions made by me.    All risks, benefits and alternatives were discussed with patient.  Patient is in agreement and understands the assessment and plan.  All questions were answered.  Sun Screen Education was given.   Return to Clinic in 3 months or sooner as needed.   Risa Freeman PA-C   [facility]

## 2017-10-12 ENCOUNTER — TELEPHONE (OUTPATIENT)
Dept: DERMATOLOGY | Facility: CLINIC | Age: 82
End: 2017-10-12

## 2017-10-12 NOTE — TELEPHONE ENCOUNTER
[facility] Prior Authorization Team   Phone: [phone]  Fax: [phone]      PA Initiation    Medication: fluocinonide (LIDEX) 0.05 % ointment  Insurance Company: [employer] - Phone [phone] Fax [phone]  Pharmacy Filling the Rx: [id number] [place] [address]  Filling Pharmacy Phone: [phone]  Filling Pharmacy Fax: [phone]  Start Date: 10/12/2017

## 2018-03-28 ENCOUNTER — OFFICE VISIT (OUTPATIENT)
Dept: URGENT CARE | Facility: URGENT CARE | Age: 83
End: 2018-03-28
Payer: MEDICARE

## 2018-03-28 VITALS
DIASTOLIC BLOOD PRESSURE: 82 MMHG | TEMPERATURE: 98.7 F | OXYGEN SATURATION: 95 % | BODY MASS INDEX: 27.17 KG/M2 | WEIGHT: 130 LBS | HEART RATE: 80 BPM | SYSTOLIC BLOOD PRESSURE: 164 MMHG

## 2018-03-28 DIAGNOSIS — M25.462 SWELLING OF KNEE JOINT, LEFT: Primary | ICD-10-CM

## 2018-03-28 DIAGNOSIS — Z96.652 S/P TOTAL KNEE ARTHROPLASTY, LEFT: ICD-10-CM

## 2018-03-28 DIAGNOSIS — M25.562 ACUTE PAIN OF LEFT KNEE: ICD-10-CM

## 2018-03-28 DIAGNOSIS — R68.83 CHILLS: ICD-10-CM

## 2018-03-28 PROCEDURE — 99214 OFFICE O/P EST MOD 30 MIN: CPT | Performed by: INTERNAL MEDICINE

## 2018-03-28 NOTE — MR AVS SNAPSHOT
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            Follow-ups after your visit        Your next 10 appointments already scheduled     Mar 29, 2018 11:00 AM CDT   (Arrive by 10:45 AM)   New Patient Visit with Chaparro Weiner MD   [facility] ([facility])    [address] [place] [address]  [address] [facility] [address] [phone]            May 31, 2018 11:00 AM CDT   (Arrive by 10:45 AM)   Return Visit with Risa Freeman PA-C   [facility] ([facility])    [address] [place] [address]  [address] [facility] [address] [phone]              Who to contact     If you have questions or need follow up information about today's clinic visit or your schedule please contact [facility] directly at [phone].  Normal or non-critical lab and imaging results will be communicated to you by MyChart, letter or phone within 4 business days after the clinic has received the results. If you do not hear from us within 7 days, please contact the clinic through MyChart or phone. If you have a critical or abnormal lab result, we will notify you by phone as soon as possible.  Submit refill requests through [facility] or call your pharmacy and they will forward the refill request to us. Please allow 3 business days for your refill to be completed.          Additional Information About Your Visit        MyChart Information      "[employer] lets you send messages to your doctor, view your test results, renew your prescriptions, schedule appointments and more. To sign up, go to [email or web address] . Click on \"Log in\" on the left side of the screen, which will take you to the Welcome page. Then click on \"Sign up Now\" on the right side of the page.     You will be asked to enter the access code listed below, as well as some personal information. Please follow the directions to create your username and password.     Your access code is: [email or web address]  Expires: 2018  7:30 AM     Your access code will  in 90 days. If you need help or a new code, please call your [facility] clinic or [phone].        Care EveryWhere ID     This is your [facility] EveryWhere ID. This could be used by other organizations to access your [facility] medical records  [id number]        Your Vitals Were     Pulse Temperature Pulse Oximetry BMI (Body Mass Index)          80 98.7  F (37.1  C) (Oral) 95% 27.17 kg/m2         Blood Pressure from Last 3 Encounters:   18 164/82   17 (!) 171/95   17 131/85    Weight from Last 3 Encounters:   18 130 lb (59 kg)   17 137 lb 6.4 oz (62.3 kg)   17 140 lb 8 oz (63.7 kg)              Today, you had the following     No orders found for display       Primary Care Provider Office Phone # Fax #    [facility] [phone] [address] [facility] [address]        Equal Access to Services     MEETA ANDRE : Hadjyoti Uriarte, matthias spring, fabián ardon. So [facility] [phone].    ATENCIÓN: Si habla español, tiene a lundberg disposición servicios gratuitos de asistencia lingüística. Llame al [phone].    We comply with applicable federal civil rights laws and [place] laws. We do not discriminate on the basis of race, color, national origin, age, disability, sex, sexual orientation, or gender " identity.            Thank you!     Thank you for choosing [facility]  for your care. Our goal is always to provide you with excellent care. Hearing back from our patients is one way we can continue to improve our services. Please take a few minutes to complete the written survey that you may receive in the mail after your visit with us. Thank you!             Your Updated Medication List - Protect others around you: Learn how to safely use, store and throw away your medicines at www.disposemymeds.org.          This list is accurate as of 3/28/18  7:38 PM.  Always use your most recent med list.                   Brand Name Dispense Instructions for use Diagnosis    aspirin 81 MG chewable tablet     108 tablet    Take 1 tablet (81 mg) by mouth daily    Type 2 diabetes, HbA1C goal < 8% (H)       atorvastatin 10 MG tablet    LIPITOR    90 tablet    Take 1 tablet (10 mg) by mouth daily    Type 2 diabetes mellitus with diabetic nephropathy (H)       * blood glucose monitoring test strip    no brand specified    3 Month    1 Box by Device route 3 times daily. Please dispense three month supply with three refills of whatever test strip Angella needs.  Thank you.    Type 2 diabetes, HbA1c goal < 7% (H)       * ACCU-CHEK ACTIVE STRIPS test strip   Generic drug:  blood glucose monitoring           blood pressure kit Kit     1 kit    1 kit 2 times daily    Hypertension goal BP (blood pressure) < 140/90       carvedilol 6.25 MG tablet    COREG    180 tablet    Take 1 tablet (6.25 mg) by mouth 2 times daily (with meals)    Benign essential hypertension       COMPRESSION STOCKINGS     2 each    Please dispense 2 pairs of customized compression stockings    Chronic kidney disease, stage III (moderate), Bilateral leg edema, Type 2 diabetes mellitus with diabetic nephropathy, without long-term current use of insulin (H)       fluocinonide 0.05 % ointment    LIDEX    60 g    Apply topically At Bedtime     Venous stasis dermatitis of left lower extremity       furosemide 20 MG tablet    LASIX     Take 80 mg by mouth        GHT BLOOD GLUCOSE MONITOR W/DEVICE Kit           glimepiride 1 MG tablet    AMARYL    90 tablet    Take 1 tablet (1 mg) by mouth every morning (before breakfast)    Type 2 diabetes mellitus with diabetic nephropathy (H)       losartan 100 MG tablet    COZAAR     Take 100 mg by mouth        metFORMIN 1000 MG tablet    GLUCOPHAGE    180 tablet    Take 1 tablet (1,000 mg) by mouth 2 times daily (with meals)    Type 2 diabetes, HbA1C goal < 8% (H)       PROBIOTIC & ACIDOPHILUS EX ST Caps      Take 1 capsule by mouth daily        triamcinolone 0.1 % ointment    KENALOG    80 g    Apply to lower leg rash nightly (cover with socks)    Stasis dermatitis of both legs       VITAMIN D (CHOLECALCIFEROL) PO      Take 1,000 Units by mouth daily        * Notice:  This list has 2 medication(s) that are the same as other medications prescribed for you. Read the directions carefully, and ask your doctor or other care provider to review them with you.

## 2018-03-28 NOTE — NURSING NOTE
"Chief Complaint   Patient presents with     Urgent Care     Knee Pain     A week ago Tuesday woke up with pain in left knee, has swelling from knee down.  Reports pain from knee shoots up to left hip and down to left ankle.  Has been applying cold packs but now using heat which seems to help more.  Not aware of any strain or injury to her knee.     Knee Pain     Notes blood glucoses has been elevated (150 - 155) the last few days; usually under good control per pt.  Also reports she's been having some chills.     Initial /82  Pulse 80  Temp 98.7  F (37.1  C) (Oral)  Wt 130 lb (59 kg)  SpO2 95%  BMI 27.17 kg/m2 Estimated body mass index is 27.17 kg/(m^2) as calculated from the following:    Height as of 2/20/17: 4' 10\" (1.473 m).    Weight as of this encounter: 130 lb (59 kg)..  BP completed using cuff size: allie Schmidt RN  "

## 2018-03-29 NOTE — PROGRESS NOTES
SUBJECTIVE:   Angella Ward is a 84 year old female presenting with a chief complaint of   Chief Complaint   Patient presents with     Urgent Care     Knee Pain     A week ago Tuesday woke up with pain in left knee, has swelling from knee down.  Reports pain from knee shoots up to left hip and down to left ankle.  Has been applying cold packs but now using heat which seems to help more.  Not aware of any strain or injury to her knee.     Knee Pain     Notes blood glucoses has been elevated (150 - 155) the last few days; usually under good control per pt.  Also reports she's been having some chills.       She is an established patient of [facility].    Onset of symptoms was 1 week(s) ago.  Location: right knee    Current and Associated symptoms: Pain, Swelling, Warmth and Tenderness  Pain can radiate up to the hip from the knee and also radiate down to the ankle from the knee.  Walking is more painful. No injury recalled    Patient has had history of knee joint infections in the past with similar symptoms after her total knee arthroplasty. .    For 1 of her knee joint infections she had to have the hardware removed, received IV antibiotics for 6 weeks while living in a nursing home and prior to hardware being replaced.    Hx TKarthroplasty      She has been trying to see an orthopedic specialist but has been told that appointment will not be allowed until her records are received for review.  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     Osteoporosis 6/10/2013    Se lesa Eaton 11/7/2008     Squamous cell carcinoma      Family  History   Problem Relation Age of Onset     DIABETES Father      Asthma Mother      CANCER Maternal Grandmother      CANCER Maternal Grandfather      CEREBROVASCULAR DISEASE Paternal Grandmother      Melanoma No family hx of      Skin Cancer No family hx of      Current Outpatient Prescriptions   Medication Sig Dispense Refill     glimepiride (AMARYL) 1 MG tablet Take 1 tablet (1 mg) by mouth every morning (before breakfast) 90 tablet 3     carvedilol (COREG) 6.25 MG tablet Take 1 tablet (6.25 mg) by mouth 2 times daily (with meals) 180 tablet 3     furosemide (LASIX) 20 MG tablet Take 80 mg by mouth        losartan (COZAAR) 100 MG tablet Take 100 mg by mouth       blood glucose monitoring (ACCU-CHEK ACTIVE STRIPS) test strip        Blood Glucose Monitoring Suppl ([facility] BLOOD GLUCOSE MONITOR) W/DEVICE KIT        metFORMIN (GLUCOPHAGE) 1000 MG tablet Take 1 tablet (1,000 mg) by mouth 2 times daily (with meals) 180 tablet 3     Blood Pressure Monitoring (BLOOD PRESSURE KIT) KIT 1 kit 2 times daily 1 kit 0     glucose blood VI test strips strip 1 Box by Device route 3 times daily. Please dispense three month supply with three refills of whatever test strip Angella needs.  Thank you. 3 Month 3     fluocinonide (LIDEX) 0.05 % ointment Apply topically At Bedtime (Patient not taking: Reported on 3/28/2018) 60 g 1     Probiotic Product (PROBIOTIC & ACIDOPHILUS EX ST) CAPS Take 1 capsule by mouth daily       triamcinolone (KENALOG) 0.1 % ointment Apply to lower leg rash nightly (cover with socks) (Patient not taking: Reported on 3/28/2018) 80 g 3     VITAMIN D, CHOLECALCIFEROL, PO Take 1,000 Units by mouth daily       COMPRESSION STOCKINGS Please dispense 2 pairs of customized compression stockings (Patient not taking: Reported on 3/28/2018) 2 each 3     atorvastatin (LIPITOR) 10 MG tablet Take 1 tablet (10 mg) by mouth daily (Patient not taking: Reported on 3/28/2018) 90 tablet 3     aspirin 81 MG chewable tablet Take 1  tablet (81 mg) by mouth daily (Patient not taking: Reported on 3/28/2018) 108 tablet 3     Social History   Substance Use Topics     Smoking status: Never Smoker     Smokeless tobacco: Never Used     Alcohol use Yes      Comment: drink once monthly       OBJECTIVE  /82  Pulse 80  Temp 98.7  F (37.1  C) (Oral)  Wt 130 lb (59 kg)  SpO2 95%  BMI 27.17 kg/m2    Physical Exam   Constitutional: She appears well-developed and well-nourished.   Musculoskeletal:   left knee significantly larger and warm to touch in comparison to right knee.  No redness    Very tender to touch           Labs:  No results found for this or any previous visit (from the past 24 hour(s)).    X-Ray was not done.    ASSESSMENT:      ICD-10-CM    1. Swelling of knee joint, left M25.462    2. Chills R68.83    3. S/P total knee arthroplasty, left Z96.652    4. Acute pain of left knee M25.562         Medical Decision Making:  Discussed I am suspicious that there may be a left knee joint infection with her existing knee replacement.    Discussed that  I could check CBC & CRP level , but I would still be suspicous for knee hardware infection even if these are normal.  I stressed importance that she needs to see orthopedic for joint aspiration prior to start antibiotics.  Patient and her daughter inquired why I could not put her on oral antibiotics and I stated that I would interfere with finding out cause of infection if I started her prematurely on medication prior to cultures being taken.    As patient is unable to see orthopedist as outpatient and also gravity above potential hardware knee joint infection recommended going to the emergency room for evaluation and treatment.  Discussed the emergency room could get an orthopedic consult.  She may need to be hospitalized if they also suspect joint infection.    Impressed on going to ER tonight to hasten workup & ortho eval  (pt wanted to wait till the morning)      Serious Comorbid  Conditions:  Adult:  Diabetes    PLAN:      Patient Instructions   suspicous for left knee joint (s/P replacement) infection    Daughter to drive to [facility]     Call or return to clinic if symptoms worsen or fail to improve as anticipated.

## 2018-03-29 NOTE — PATIENT INSTRUCTIONS
suspvladimirus for left knee joint (s/P replacement) infection    Daughter to drive to [facility] ER     Call or return to clinic if symptoms worsen or fail to improve as anticipated.

## 2018-09-22 DIAGNOSIS — E11.21 TYPE 2 DIABETES MELLITUS WITH DIABETIC NEPHROPATHY (H): ICD-10-CM

## 2018-09-25 RX ORDER — GLIMEPIRIDE 1 MG/1
TABLET ORAL
Qty: 30 TABLET | Refills: 0 | Status: SHIPPED | OUTPATIENT
Start: 2018-09-25

## 2018-09-25 NOTE — TELEPHONE ENCOUNTER
Last Clinic Visit: 8/21/17   NV: NONE  FYI: OVERDUE APPT.  / LABS  ( LDL MICROALBUMIN  A1C  CR ) AND [facility] BPs  Scheduling has been notified to contact the pt for appointment.

## 2021-08-06 NOTE — TELEPHONE ENCOUNTER
glimepiride       Last Written Prescription Date:  8/15/16  Last Fill Quantity: 90,   # refills: 3  Last Office Visit : 8/21/17  Future Office visit:  NONE          [At ___ Weeks Gestation] : at [unfilled] weeks gestation [ Section] : by  section [None] : there were no delivery complications [Age Appropriate] : age appropriate developmental milestones met [FreeTextEntry1] : 8 pounds 8 ounces